# Patient Record
Sex: MALE | Race: WHITE | NOT HISPANIC OR LATINO | Employment: OTHER | URBAN - METROPOLITAN AREA
[De-identification: names, ages, dates, MRNs, and addresses within clinical notes are randomized per-mention and may not be internally consistent; named-entity substitution may affect disease eponyms.]

---

## 2018-04-13 ENCOUNTER — OFFICE VISIT (OUTPATIENT)
Dept: NEUROLOGY | Facility: MEDICAL CENTER | Age: 78
End: 2018-04-13
Payer: MEDICARE

## 2018-04-13 ENCOUNTER — HOSPITAL ENCOUNTER (OUTPATIENT)
Dept: LAB | Facility: MEDICAL CENTER | Age: 78
End: 2018-04-13
Attending: PSYCHIATRY & NEUROLOGY
Payer: MEDICARE

## 2018-04-13 VITALS
DIASTOLIC BLOOD PRESSURE: 60 MMHG | WEIGHT: 159.72 LBS | HEART RATE: 65 BPM | TEMPERATURE: 97.7 F | SYSTOLIC BLOOD PRESSURE: 130 MMHG | BODY MASS INDEX: 25.67 KG/M2 | RESPIRATION RATE: 16 BRPM | OXYGEN SATURATION: 98 % | HEIGHT: 66 IN

## 2018-04-13 DIAGNOSIS — I70.90 ATHEROSCLEROSIS: ICD-10-CM

## 2018-04-13 DIAGNOSIS — Z78.9 VEGAN DIET: ICD-10-CM

## 2018-04-13 DIAGNOSIS — G20.A1 PARKINSON DISEASE: ICD-10-CM

## 2018-04-13 DIAGNOSIS — F03.90 DEMENTIA WITHOUT BEHAVIORAL DISTURBANCE, UNSPECIFIED DEMENTIA TYPE: ICD-10-CM

## 2018-04-13 LAB
CRP SERPL HS-MCNC: 0.17 MG/DL (ref 0–0.75)
ERYTHROCYTE [SEDIMENTATION RATE] IN BLOOD BY WESTERGREN METHOD: 36 MM/HOUR (ref 0–20)
HCYS SERPL-SCNC: 18.82 UMOL/L
THYROPEROXIDASE AB SERPL-ACNC: 0.8 IU/ML (ref 0–9)

## 2018-04-13 PROCEDURE — 85652 RBC SED RATE AUTOMATED: CPT

## 2018-04-13 PROCEDURE — 99204 OFFICE O/P NEW MOD 45 MIN: CPT | Performed by: PSYCHIATRY & NEUROLOGY

## 2018-04-13 PROCEDURE — 86376 MICROSOMAL ANTIBODY EACH: CPT

## 2018-04-13 PROCEDURE — 84207 ASSAY OF VITAMIN B-6: CPT

## 2018-04-13 PROCEDURE — 86225 DNA ANTIBODY NATIVE: CPT

## 2018-04-13 PROCEDURE — 83090 ASSAY OF HOMOCYSTEINE: CPT

## 2018-04-13 PROCEDURE — 86038 ANTINUCLEAR ANTIBODIES: CPT

## 2018-04-13 PROCEDURE — 36415 COLL VENOUS BLD VENIPUNCTURE: CPT

## 2018-04-13 PROCEDURE — 86235 NUCLEAR ANTIGEN ANTIBODY: CPT | Mod: 91

## 2018-04-13 PROCEDURE — 86140 C-REACTIVE PROTEIN: CPT

## 2018-04-13 PROCEDURE — 83921 ORGANIC ACID SINGLE QUANT: CPT

## 2018-04-13 RX ORDER — M-VIT,TX,IRON,MINS/CALC/FOLIC 27MG-0.4MG
1 TABLET ORAL DAILY
COMMUNITY

## 2018-04-13 RX ORDER — MEMANTINE HYDROCHLORIDE 5 MG/1
5 TABLET ORAL 2 TIMES DAILY
Qty: 60 TAB | Refills: 3 | Status: SHIPPED | OUTPATIENT
Start: 2018-04-13 | End: 2018-08-30 | Stop reason: SDUPTHER

## 2018-04-13 RX ORDER — CHLORAL HYDRATE 500 MG
1000 CAPSULE ORAL
COMMUNITY

## 2018-04-13 RX ORDER — UBIDECARENONE 75 MG
5000 CAPSULE ORAL DAILY
COMMUNITY

## 2018-04-13 ASSESSMENT — PATIENT HEALTH QUESTIONNAIRE - PHQ9
SUM OF ALL RESPONSES TO PHQ QUESTIONS 1-9: 9
5. POOR APPETITE OR OVEREATING: 2 - MORE THAN HALF THE DAYS
CLINICAL INTERPRETATION OF PHQ2 SCORE: 3

## 2018-04-13 NOTE — PATIENT INSTRUCTIONS
Resting tremor - both sides-- : subtle  Rigidity both sides  Bradykinesia both sides R > L  Postural reflex :     Recall 0/3 ( despite 2 trials)  Mother developed dementia at the age of 80+    IMPRESSION:    1. Cognitive Decline since 2015-- progressively decline- Dementia  2. Hx of shoulder replacement and shoulder surgery  3. One episode of total memory loss-- similar to transient global amnesia( got MRI of brain in [place] 5 years ago)  4. Parkinson Diease vs Parkinsonism  5. Strict Vegan Diet -- in the past 10 years  6. Mild kidney malfunction --- Creatinine 1.7  7. Using Diaper for one year    PLAN/RECOMMENDATIONS:      Explain to the family that Mr. Naidu has dementia and parkinson    We will get MRI to exclude hydrocephalus , EEG and blood tests  We will start mediation right now    First of all, we will start   Sinemet 25/100 half tab  3 times per day for 2 weeks, then Sinemet 25/100 one tab three times per day  Call us in one month or any other side effects-- we may increase Sinemet further on the phone     After the first month, start another medication  Namenda 5mg two times per day          SIGNATURE:  Cora Goyal      CC:  Dr. Marley Sevilla MD  in [facility]

## 2018-04-13 NOTE — PROGRESS NOTES
NEUROLOGY NOTE    Referring Physician  Dr. Marley Sevilla MD  in [facility]      CHIEF COMPLAINT:  Progressive memory loss  Chief Complaint   Patient presents with   • Establish Care     Memory loss       PRESENT ILLNESS:   Progressive memory loss in the past 3 years, a little shaky recently   The patient was sent to us by his PCP for further care  Cognitive processing speed slowed down  Denied personality change or visual spatial change    Left shoulder surgery status post rotator cuff, right shoulder status post replacement    RED FLAGS for reversible dementia  Headache X  Fluctuation course X  Tremor V   Rapid Progressive onset ( within 2 years) X  MRI hippocampus not atrophy ?        PAST MEDICAL HISTORY:  No past medical history on file.    PAST SURGICAL HISTORY:  No past surgical history on file.    FAMILY HISTORY:  No family history on file.    SOCIAL HISTORY:  Social History     Social History   • Marital status:      Spouse name: N/A   • Number of children: N/A   • Years of education: N/A     Occupational History   • Not on file.     Social History Main Topics   • Smoking status: Not on file   • Smokeless tobacco: Not on file   • Alcohol use Not on file   • Drug use: Unknown   • Sexual activity: Not on file     Other Topics Concern   • Not on file     Social History Narrative   • No narrative on file     ALLERGIES:  Not on File  TOBHX  History   Smoking Status   • Not on file   Smokeless Tobacco   • Not on file     ALCHX  History   Alcohol use Not on file     DRUGHX  History   Drug use: Unknown           MEDICATIONS:  Current Outpatient Prescriptions   Medication   • cyanocobalamin (VITAMIN B-12) 100 MCG Tab   • vitamin D (CHOLECALCIFEROL) 1000 UNIT Tab   • therapeutic multivitamin-minerals (THERAGRAN-M) Tab   • Omega-3 Fatty Acids (FISH OIL) 1000 MG Cap capsule     No current facility-administered medications for this visit.        REVIEW OF SYSTEM:    Constitutional: Denies fevers, Denies weight  "changes   Eyes: Denies changes in vision, no eye pain   Ears/Nose/Throat/Mouth: hearing impairment  Cardiovascular: Denies chest pain or palpitations   Respiratory: Denies SOB.   Gastrointestinal/Hepatic: Denies abdominal pain, nausea, vomiting, diarrhea, constipation or GI bleeding   Genitourinary: Denies bladder dysfunction, dysuria or frequency   Musculoskeletal/Rheum: Denies joint pain and swelling   Skin/Breast: Denies rash, denies breast lumps or discharge   Neurological: memory problems, confusion  Psychiatric: denies mood disorder   Endocrine: denies hx of diabetes or thyroid dysfunction   Heme/Oncology/Lymph Nodes: Denies enlarged lymph nodes, denies brusing or known bleeding disorder   Allergic/Immunologic: Denies hx of allergies         PHYSICAL AND NEUROLOGICAL EXMAINATIONS:  VITAL SIGNS: /60   Pulse 65   Temp 36.5 °C (97.7 °F)   Resp 16   Ht 1.676 m (5' 6\")   Wt 72.4 kg (159 lb 11.6 oz)   SpO2 98%   BMI 25.78 kg/m²   CURRENT WEIGHT: BMI: Body mass index is 25.78 kg/m².  PREVIOUS WEIGHTS:  Wt Readings from Last 25 Encounters:   04/13/18 72.4 kg (159 lb 11.6 oz)       General appearance of patient: WDWN(+) NAD(+)    EYES  o Fundus : Papilledem(-) Exudates(-) Hemorrhage(-)  Nervous System  Orientation to time, place and person(+)  Memory normal(-) Recall 0/3  Language: aphasia(-)  Knowledge: past(+) Current(+)  Attention(+)  Cranial Nerves  • Nerve 2: intact  • Nerve 3,4,6: intact  • Nerve 5 : intact  • Nerve 7: intact  • Nerve 8: hearing impairment  • Nerve 9 & 10: intact  • Nerve 11: intact  • Nerve 12: intact  Muscle Power and muscle tone: symmetric in upper and lower  Sensory System: Pin sensation intact(+)  Reflexes: symmetric throughout  Cerebellar Function FNP normal   Gait : Steady(-)   Heart and Vascular  Peripheral Vasucular system : Edema (-) Swelling(-)  RHB, Breathing sound clear  abdomen bowel sound normoactive  Extremities freely moveable  Joints no contracture   "     NEUROIMAGING:        LAB:          Resting tremor - both sides-- : subtle  Rigidity both sides  Bradykinesia both sides R > L  Postural reflex :     Recall 0/3 ( despite 2 trials)  Mother developed dementia at the age of 80+    IMPRESSION:    1. Cognitive Decline since 2015-- progressively decline- Dementia  2. Hx of shoulder replacement and shoulder surgery  3. One episode of total memory loss-- similar to transient global amnesia( got MRI of brain in [place] 5 years ago)  4. Parkinson Diease vs Parkinsonism  5. Strict Vegan Diet -- in the past 10 years  6. Mild kidney malfunction --- Creatinine 1.7  7. Using Diaper for one year    PLAN/RECOMMENDATIONS:      Explain to the family that Mr. Naidu has dementia and parkinson    We will get MRI to exclude hydrocephalus , EEG and blood tests  We will start mediation right now    First of all, we will start   Sinemet 25/100 half tab  3 times per day for 2 weeks, then Sinemet 25/100 one tab three times per day  Call us in one month or any other side effects-- we may increase Sinemet further on the phone     After the first month, start another medication  Namenda 5mg two times per day          SIGNATURE:  Cora Goyal      CC:  Dr. Marley Sevilla MD  in [facility]

## 2018-04-15 LAB — NUCLEAR IGG SER QL IA: NORMAL

## 2018-04-17 LAB
METHYLMALONATE SERPL-SCNC: 0.19 UMOL/L (ref 0–0.4)
VIT B6 SERPL-MCNC: 121 NMOL/L (ref 20–125)

## 2018-04-25 ENCOUNTER — TELEPHONE (OUTPATIENT)
Dept: NEUROLOGY | Facility: MEDICAL CENTER | Age: 78
End: 2018-04-25

## 2018-05-08 ENCOUNTER — HOSPITAL ENCOUNTER (OUTPATIENT)
Dept: RADIOLOGY | Facility: MEDICAL CENTER | Age: 78
End: 2018-05-08
Attending: PSYCHIATRY & NEUROLOGY
Payer: MEDICARE

## 2018-05-08 DIAGNOSIS — F03.90 DEMENTIA WITHOUT BEHAVIORAL DISTURBANCE, UNSPECIFIED DEMENTIA TYPE: ICD-10-CM

## 2018-05-08 DIAGNOSIS — Z78.9 VEGAN DIET: ICD-10-CM

## 2018-05-08 DIAGNOSIS — G20.A1 PARKINSON DISEASE: ICD-10-CM

## 2018-05-08 PROCEDURE — 70551 MRI BRAIN STEM W/O DYE: CPT

## 2018-06-19 ENCOUNTER — OFFICE VISIT (OUTPATIENT)
Dept: NEPHROLOGY | Facility: MEDICAL CENTER | Age: 78
End: 2018-06-19
Payer: MEDICARE

## 2018-06-19 ENCOUNTER — HOSPITAL ENCOUNTER (OUTPATIENT)
Dept: LAB | Facility: MEDICAL CENTER | Age: 78
End: 2018-06-19
Attending: INTERNAL MEDICINE
Payer: MEDICARE

## 2018-06-19 ENCOUNTER — TELEPHONE (OUTPATIENT)
Dept: NEUROLOGY | Facility: MEDICAL CENTER | Age: 78
End: 2018-06-19

## 2018-06-19 VITALS
RESPIRATION RATE: 14 BRPM | DIASTOLIC BLOOD PRESSURE: 60 MMHG | TEMPERATURE: 99.6 F | WEIGHT: 156 LBS | OXYGEN SATURATION: 96 % | HEIGHT: 66 IN | BODY MASS INDEX: 25.07 KG/M2 | HEART RATE: 69 BPM | SYSTOLIC BLOOD PRESSURE: 130 MMHG

## 2018-06-19 DIAGNOSIS — G20.A1 PARKINSON DISEASE: ICD-10-CM

## 2018-06-19 DIAGNOSIS — N17.9 AKI (ACUTE KIDNEY INJURY) (HCC): ICD-10-CM

## 2018-06-19 DIAGNOSIS — M54.9 CHRONIC BACK PAIN, UNSPECIFIED BACK LOCATION, UNSPECIFIED BACK PAIN LATERALITY: ICD-10-CM

## 2018-06-19 DIAGNOSIS — D64.9 ANEMIA, UNSPECIFIED TYPE: ICD-10-CM

## 2018-06-19 DIAGNOSIS — R32 URINARY INCONTINENCE, UNSPECIFIED TYPE: ICD-10-CM

## 2018-06-19 DIAGNOSIS — D49.7 PITUITARY TUMOR: ICD-10-CM

## 2018-06-19 DIAGNOSIS — G89.29 CHRONIC BACK PAIN, UNSPECIFIED BACK LOCATION, UNSPECIFIED BACK PAIN LATERALITY: ICD-10-CM

## 2018-06-19 LAB
ANION GAP SERPL CALC-SCNC: 11 MMOL/L (ref 0–11.9)
BASOPHILS # BLD AUTO: 0.9 % (ref 0–1.8)
BASOPHILS # BLD: 0.04 K/UL (ref 0–0.12)
BUN SERPL-MCNC: 51 MG/DL (ref 8–22)
C3 SERPL-MCNC: 129 MG/DL (ref 87–200)
C4 SERPL-MCNC: 34 MG/DL (ref 19–52)
CALCIUM SERPL-MCNC: 9.3 MG/DL (ref 8.5–10.5)
CHLORIDE SERPL-SCNC: 100 MMOL/L (ref 96–112)
CO2 SERPL-SCNC: 26 MMOL/L (ref 20–33)
CREAT SERPL-MCNC: 2.68 MG/DL (ref 0.5–1.4)
CREAT UR-MCNC: 52.6 MG/DL
EOSINOPHIL # BLD AUTO: 0.06 K/UL (ref 0–0.51)
EOSINOPHIL NFR BLD: 1.4 % (ref 0–6.9)
ERYTHROCYTE [DISTWIDTH] IN BLOOD BY AUTOMATED COUNT: 42.9 FL (ref 35.9–50)
FERRITIN SERPL-MCNC: 179.9 NG/ML (ref 22–322)
GLUCOSE SERPL-MCNC: 105 MG/DL (ref 65–99)
HCT VFR BLD AUTO: 30 % (ref 42–52)
HGB BLD-MCNC: 10.1 G/DL (ref 14–18)
IMM GRANULOCYTES # BLD AUTO: 0.02 K/UL (ref 0–0.11)
IMM GRANULOCYTES NFR BLD AUTO: 0.5 % (ref 0–0.9)
IRON SATN MFR SERPL: 15 % (ref 15–55)
IRON SERPL-MCNC: 47 UG/DL (ref 50–180)
LYMPHOCYTES # BLD AUTO: 1.22 K/UL (ref 1–4.8)
LYMPHOCYTES NFR BLD: 27.5 % (ref 22–41)
MCH RBC QN AUTO: 30.9 PG (ref 27–33)
MCHC RBC AUTO-ENTMCNC: 33.7 G/DL (ref 33.7–35.3)
MCV RBC AUTO: 91.7 FL (ref 81.4–97.8)
MICROALBUMIN UR-MCNC: <0.7 MG/DL
MICROALBUMIN/CREAT UR: NORMAL MG/G (ref 0–30)
MONOCYTES # BLD AUTO: 0.44 K/UL (ref 0–0.85)
MONOCYTES NFR BLD AUTO: 9.9 % (ref 0–13.4)
NEUTROPHILS # BLD AUTO: 2.66 K/UL (ref 1.82–7.42)
NEUTROPHILS NFR BLD: 59.8 % (ref 44–72)
NRBC # BLD AUTO: 0 K/UL
NRBC BLD-RTO: 0 /100 WBC
PLATELET # BLD AUTO: 196 K/UL (ref 164–446)
PMV BLD AUTO: 10.4 FL (ref 9–12.9)
POTASSIUM SERPL-SCNC: 4.1 MMOL/L (ref 3.6–5.5)
RBC # BLD AUTO: 3.27 M/UL (ref 4.7–6.1)
SODIUM SERPL-SCNC: 137 MMOL/L (ref 135–145)
TIBC SERPL-MCNC: 316 UG/DL (ref 250–450)
TRANSFERRIN SERPL-MCNC: 228 MG/DL (ref 200–370)
WBC # BLD AUTO: 4.4 K/UL (ref 4.8–10.8)

## 2018-06-19 PROCEDURE — 99203 OFFICE O/P NEW LOW 30 MIN: CPT | Performed by: INTERNAL MEDICINE

## 2018-06-19 PROCEDURE — 86160 COMPLEMENT ANTIGEN: CPT

## 2018-06-19 PROCEDURE — 82043 UR ALBUMIN QUANTITATIVE: CPT

## 2018-06-19 PROCEDURE — 84165 PROTEIN E-PHORESIS SERUM: CPT

## 2018-06-19 PROCEDURE — 82570 ASSAY OF URINE CREATININE: CPT

## 2018-06-19 PROCEDURE — 86038 ANTINUCLEAR ANTIBODIES: CPT

## 2018-06-19 PROCEDURE — 84466 ASSAY OF TRANSFERRIN: CPT

## 2018-06-19 PROCEDURE — 85025 COMPLETE CBC W/AUTO DIFF WBC: CPT

## 2018-06-19 PROCEDURE — 80048 BASIC METABOLIC PNL TOTAL CA: CPT

## 2018-06-19 PROCEDURE — 83540 ASSAY OF IRON: CPT

## 2018-06-19 PROCEDURE — 83550 IRON BINDING TEST: CPT

## 2018-06-19 PROCEDURE — 82728 ASSAY OF FERRITIN: CPT

## 2018-06-19 PROCEDURE — 36415 COLL VENOUS BLD VENIPUNCTURE: CPT

## 2018-06-19 PROCEDURE — 84160 ASSAY OF PROTEIN ANY SOURCE: CPT

## 2018-06-19 ASSESSMENT — ENCOUNTER SYMPTOMS
DEPRESSION: 1
NAUSEA: 0
NERVOUS/ANXIOUS: 1
MEMORY LOSS: 1
VOMITING: 0
SHORTNESS OF BREATH: 0
COUGH: 0

## 2018-06-19 NOTE — PROGRESS NOTES
"Subjective:      Hal Naidu is a 78 y.o. male who presents with Chronic Kidney Disease            Patient is an unfortunate 78-year-old gentleman, recently diagnosed with dementia/Parkinson disease.  He also developed acute kidney injury with a creatinine in 2017 was 1 mg/dL, 1.7 mg/dL in February 2017, and in April was 2.4 mg/dL.  Patient was on chronic NSAIDs use for chronic back pain      Chronic Kidney Disease   This is a new problem. The current episode started more than 1 month ago. The problem occurs constantly. The problem has been rapidly worsening. Associated symptoms include urinary symptoms. Pertinent negatives include no chest pain, coughing, nausea or vomiting. Exacerbated by: NSAIDs.       Review of Systems   Respiratory: Negative for cough and shortness of breath.    Cardiovascular: Negative for chest pain and leg swelling.   Gastrointestinal: Negative for nausea and vomiting.   Genitourinary: Negative for dysuria, frequency and urgency.        Urinary incontinence   Psychiatric/Behavioral: Positive for depression and memory loss. The patient is nervous/anxious.    All other systems reviewed and are negative.         Objective:     /60   Pulse 69   Temp 37.6 °C (99.6 °F)   Resp 14   Ht 1.676 m (5' 6\")   Wt 70.8 kg (156 lb)   SpO2 96%   BMI 25.18 kg/m²      Physical Exam   Constitutional: He is oriented to person, place, and time. He appears well-developed and well-nourished. No distress.   HENT:   Head: Normocephalic and atraumatic.   Right Ear: External ear normal.   Left Ear: External ear normal.   Nose: Nose normal.   Mouth/Throat: No oropharyngeal exudate.   Eyes: Conjunctivae are normal. Right eye exhibits no discharge. Left eye exhibits no discharge. No scleral icterus.   Neck: No JVD present. No tracheal deviation present. No thyromegaly present.   Cardiovascular: Normal rate and regular rhythm.    Pulmonary/Chest: Effort normal and breath sounds normal. No " respiratory distress. He has no wheezes. He has no rales.   Abdominal: Soft. Bowel sounds are normal. He exhibits no distension. There is no tenderness. There is no guarding.   Musculoskeletal: He exhibits no edema or tenderness.   Lymphadenopathy:     He has no cervical adenopathy.   Neurological: He is alert and oriented to person, place, and time.   Skin: Skin is warm and dry. He is not diaphoretic. No erythema.   Psychiatric: His behavior is normal. His mood appears anxious.   Nursing note and vitals reviewed.    I have reviewed the recent MRI results  I also discussed the case with the patient's neurologist           Assessment/Plan:     1. UZIEL (acute kidney injury) (HCC)  The etiology is not very clear, possible chronic interstitial nephritis from NSAID use  We need to rule out obstructive uropathy  Check renal ultrasound  No acute need for dialysis  High-risk  Consider a kidney biopsy  2. Anemia, unspecified type  Check SPEP to rule out multiple myeloma    3. Chronic back pain, unspecified back location, unspecified back pain laterality  Avoid nephrotoxins like NSAIDs    4. Parkinson disease (HCC)    5. Urinary incontinence, unspecified type  Check renal ultrasound

## 2018-06-19 NOTE — TELEPHONE ENCOUNTER
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

## 2018-06-21 ENCOUNTER — TELEPHONE (OUTPATIENT)
Dept: NEUROLOGY | Facility: MEDICAL CENTER | Age: 78
End: 2018-06-21

## 2018-06-21 LAB — NUCLEAR IGG SER QL IA: NORMAL

## 2018-06-21 NOTE — TELEPHONE ENCOUNTER
Spoke to wife she was asking if I could give results to her. I explained do to JAG I could not without his permission. I will call back when she's  home with him.

## 2018-06-22 LAB
ALBUMIN SERPL-MCNC: 4.41 G/DL (ref 3.75–5.01)
ALPHA1 GLOB SERPL ELPH-MCNC: 0.34 G/DL (ref 0.19–0.46)
ALPHA2 GLOB SERPL ELPH-MCNC: 0.99 G/DL (ref 0.48–1.05)
B-GLOBULIN SERPL ELPH-MCNC: 0.86 G/DL (ref 0.48–1.1)
GAMMA GLOB SERPL ELPH-MCNC: 1.1 G/DL (ref 0.62–1.51)
INTERPRETATION SERPL IFE-IMP: NORMAL
MONOCLON BAND OBS SERPL: NORMAL
PATHOLOGY STUDY: NORMAL
PROT SERPL-MCNC: 7.7 G/DL (ref 6–8.3)

## 2018-06-25 ENCOUNTER — TELEPHONE (OUTPATIENT)
Dept: NEPHROLOGY | Facility: MEDICAL CENTER | Age: 78
End: 2018-06-25

## 2018-06-25 NOTE — TELEPHONE ENCOUNTER
Hello Dr. Najjar,    Pt's wife called this morning and is wondering if you can please review her husbands lab results.    Thank you!

## 2018-06-26 ENCOUNTER — HOSPITAL ENCOUNTER (OUTPATIENT)
Dept: RADIOLOGY | Facility: MEDICAL CENTER | Age: 78
End: 2018-06-26
Attending: INTERNAL MEDICINE
Payer: MEDICARE

## 2018-06-26 DIAGNOSIS — R32 URINARY INCONTINENCE, UNSPECIFIED TYPE: ICD-10-CM

## 2018-06-26 DIAGNOSIS — N17.9 AKI (ACUTE KIDNEY INJURY) (HCC): ICD-10-CM

## 2018-06-26 PROCEDURE — 76775 US EXAM ABDO BACK WALL LIM: CPT

## 2018-06-27 ENCOUNTER — OFFICE VISIT (OUTPATIENT)
Dept: NEUROLOGY | Facility: MEDICAL CENTER | Age: 78
End: 2018-06-27
Payer: MEDICARE

## 2018-06-27 ENCOUNTER — HOSPITAL ENCOUNTER (OUTPATIENT)
Dept: LAB | Facility: MEDICAL CENTER | Age: 78
End: 2018-06-27
Attending: PSYCHIATRY & NEUROLOGY
Payer: MEDICARE

## 2018-06-27 VITALS
RESPIRATION RATE: 14 BRPM | HEART RATE: 65 BPM | OXYGEN SATURATION: 100 % | BODY MASS INDEX: 24.18 KG/M2 | WEIGHT: 150.46 LBS | TEMPERATURE: 97.9 F | SYSTOLIC BLOOD PRESSURE: 128 MMHG | DIASTOLIC BLOOD PRESSURE: 60 MMHG | HEIGHT: 66 IN

## 2018-06-27 DIAGNOSIS — D49.7 PITUITARY TUMOR: ICD-10-CM

## 2018-06-27 DIAGNOSIS — G20.A1 PARKINSON DISEASE: ICD-10-CM

## 2018-06-27 DIAGNOSIS — F02.80 DEMENTIA ASSOCIATED WITH OTHER UNDERLYING DISEASE WITHOUT BEHAVIORAL DISTURBANCE (HCC): ICD-10-CM

## 2018-06-27 LAB
ANION GAP SERPL CALC-SCNC: 9 MMOL/L (ref 0–11.9)
BUN SERPL-MCNC: 29 MG/DL (ref 8–22)
CALCIUM SERPL-MCNC: 9.3 MG/DL (ref 8.5–10.5)
CHLORIDE SERPL-SCNC: 101 MMOL/L (ref 96–112)
CO2 SERPL-SCNC: 26 MMOL/L (ref 20–33)
CORTIS SERPL-MCNC: 7.8 UG/DL (ref 0–23)
CREAT SERPL-MCNC: 1.9 MG/DL (ref 0.5–1.4)
FSH SERPL-ACNC: 12.1 MIU/ML (ref 1.5–12.4)
GLUCOSE SERPL-MCNC: 84 MG/DL (ref 65–99)
LH SERPL-ACNC: 5 IU/L (ref 1.7–8.6)
POTASSIUM SERPL-SCNC: 4 MMOL/L (ref 3.6–5.5)
PROLACTIN SERPL-MCNC: 45.18 NG/ML (ref 2.1–17.7)
SODIUM SERPL-SCNC: 136 MMOL/L (ref 135–145)
T3 SERPL-MCNC: 60.2 NG/DL (ref 60–181)
T4 SERPL-MCNC: 5.2 UG/DL (ref 4–12)
TSH SERPL DL<=0.005 MIU/L-ACNC: 2.52 UIU/ML (ref 0.38–5.33)

## 2018-06-27 PROCEDURE — 36415 COLL VENOUS BLD VENIPUNCTURE: CPT

## 2018-06-27 PROCEDURE — 99214 OFFICE O/P EST MOD 30 MIN: CPT | Performed by: PSYCHIATRY & NEUROLOGY

## 2018-06-27 PROCEDURE — 84480 ASSAY TRIIODOTHYRONINE (T3): CPT

## 2018-06-27 PROCEDURE — 83001 ASSAY OF GONADOTROPIN (FSH): CPT

## 2018-06-27 PROCEDURE — 82024 ASSAY OF ACTH: CPT

## 2018-06-27 PROCEDURE — 83002 ASSAY OF GONADOTROPIN (LH): CPT

## 2018-06-27 PROCEDURE — 84146 ASSAY OF PROLACTIN: CPT

## 2018-06-27 PROCEDURE — 80048 BASIC METABOLIC PNL TOTAL CA: CPT

## 2018-06-27 PROCEDURE — 83003 ASSAY GROWTH HORMONE (HGH): CPT

## 2018-06-27 PROCEDURE — 82533 TOTAL CORTISOL: CPT

## 2018-06-27 PROCEDURE — 84436 ASSAY OF TOTAL THYROXINE: CPT

## 2018-06-27 PROCEDURE — 84443 ASSAY THYROID STIM HORMONE: CPT

## 2018-06-27 PROCEDURE — 84305 ASSAY OF SOMATOMEDIN: CPT

## 2018-06-27 NOTE — PATIENT INSTRUCTIONS
Sinemet 25/100 half tab  3 times per day for 2 weeks, then Sinemet 25/100 one tab three times per day made him sick-- will wait till the patient to become stable physically    IMPRESSION:    1. Cognitive Decline since 2015-- progressively decline- Dementia  2. Hx of shoulder replacement and shoulder surgery  3. One episode of total memory loss-- similar to transient global amnesia( got MRI of brain in [place] 5 years ago)  4. Parkinson Diease vs Parkinsonism  5. Strict Vegan Diet -- in the past 10 years  6. Mild kidney malfunction --- Creatinine 1.7,   7. Using Diaper for one year  8. Weight Loss since April 2018, constipation  9. Sinemet 25/100 half tab  3 times per day for 2 weeks, then Sinemet 25/100 one tab three times per day    PLAN/RECOMMENDATIONS:      Explain to the family that Mr. Naidu has dementia and parkinson    Waiting for EEG and blood tests  We will refer the patient to endocrinologist  Continue Namenda  ________________________________________________________________________    Magnesium L Threonate Capsules (Magtein) 2000mg daily  Vitamin B1( thiamine) 500mg daily  ________________________________________________________________________                SIGNATURE:  Cora Goyal      CC:  Dr. Marley Sevilla MD  in [facility]

## 2018-06-27 NOTE — PROGRESS NOTES
NEUROLOGY NOTE    Referring Physician  Dr. Marley Sevilla MD  in [facility]      CHIEF COMPLAINT:  Progressive memory loss  Chief Complaint   Patient presents with   • Follow-Up     Dementia       PRESENT ILLNESS:   Since last visit, the patient's mental conditions and physical conditions continue to deteriorate-- constipation, weight loss, urine    Progressive memory loss in the past 3 years, a little shaky recently   The patient was sent to us by his PCP for further care  Cognitive processing speed slowed down  Denied personality change or visual spatial change    Left shoulder surgery status post rotator cuff, right shoulder status post replacement    RED FLAGS for reversible dementia  Headache X  Fluctuation course X  Tremor V   Rapid Progressive onset ( within 2 years) X  MRI hippocampus not atrophy ?        PAST MEDICAL HISTORY:  No past medical history on file.    PAST SURGICAL HISTORY:  No past surgical history on file.    FAMILY HISTORY:  No family history on file.    SOCIAL HISTORY:  Social History     Social History   • Marital status:      Spouse name: N/A   • Number of children: N/A   • Years of education: N/A     Occupational History   • Not on file.     Social History Main Topics   • Smoking status: Not on file   • Smokeless tobacco: Not on file   • Alcohol use Not on file   • Drug use: Unknown   • Sexual activity: Not on file     Other Topics Concern   • Not on file     Social History Narrative   • No narrative on file     ALLERGIES:  No Known Allergies  TOBHX  History   Smoking Status   • Not on file   Smokeless Tobacco   • Not on file     ALCHX  History   Alcohol use Not on file     DRUGHX  History   Drug use: Unknown           MEDICATIONS:  Current Outpatient Prescriptions   Medication   • cyanocobalamin (VITAMIN B-12) 100 MCG Tab   • vitamin D (CHOLECALCIFEROL) 1000 UNIT Tab   • therapeutic multivitamin-minerals (THERAGRAN-M) Tab   • Omega-3 Fatty Acids (FISH OIL) 1000 MG Cap capsule   •  "memantine (NAMENDA) 5 MG Tab   • carbidopa-levodopa (SINEMET)  MG Tab     No current facility-administered medications for this visit.        REVIEW OF SYSTEM:    Constitutional: Denies fevers, Denies weight changes   Eyes: Denies changes in vision, no eye pain   Ears/Nose/Throat/Mouth: hearing impairment  Cardiovascular: Denies chest pain or palpitations   Respiratory: Denies SOB.   Gastrointestinal/Hepatic: Denies abdominal pain, nausea, vomiting, diarrhea, constipation or GI bleeding   Genitourinary: Denies bladder dysfunction, dysuria or frequency   Musculoskeletal/Rheum: Denies joint pain and swelling   Skin/Breast: Denies rash, denies breast lumps or discharge   Neurological: memory problems, confusion  Psychiatric: denies mood disorder   Endocrine: denies hx of diabetes or thyroid dysfunction   Heme/Oncology/Lymph Nodes: Denies enlarged lymph nodes, denies brusing or known bleeding disorder   Allergic/Immunologic: Denies hx of allergies         PHYSICAL AND NEUROLOGICAL EXMAINATIONS:  VITAL SIGNS: /60   Pulse 65   Temp 36.6 °C (97.9 °F)   Resp 14   Ht 1.676 m (5' 6\")   Wt 68.2 kg (150 lb 7.4 oz)   SpO2 100%   BMI 24.29 kg/m²   CURRENT WEIGHT: BMI: Body mass index is 24.29 kg/m².  PREVIOUS WEIGHTS:  Wt Readings from Last 25 Encounters:   06/27/18 68.2 kg (150 lb 7.4 oz)   06/19/18 70.8 kg (156 lb)   04/13/18 72.4 kg (159 lb 11.6 oz)       General appearance of patient: WDWN(+) NAD(+)    EYES  o Fundus : Papilledem(-) Exudates(-) Hemorrhage(-)  Nervous System  Orientation to time, place and person(+)  Memory normal(-) Recall 0/3  Language: aphasia(-)  Knowledge: past(+) Current(+)  Attention(+)  Cranial Nerves  • Nerve 2: intact  • Nerve 3,4,6: intact  • Nerve 5 : intact  • Nerve 7: intact  • Nerve 8: hearing impairment  • Nerve 9 & 10: intact  • Nerve 11: intact  • Nerve 12: intact  Muscle Power and muscle tone: symmetric in upper and lower  Sensory System: Pin sensation intact(+)  Reflexes: " symmetric throughout  Cerebellar Function FNP normal   Gait : Steady(-)   Heart and Vascular  Peripheral Vasucular system : Edema (-) Swelling(-)  RHB, Breathing sound clear  abdomen bowel sound normoactive  Extremities freely moveable  Joints no contracture       NEUROIMAGING:        LAB:          Resting tremor - both sides-- : subtle  Rigidity both sides  Bradykinesia both sides R > L  Postural reflex :     Recall 0/3 ( despite 2 trials)  Mother developed dementia at the age of 80+    Sinemet 25/100 half tab  3 times per day for 2 weeks, then Sinemet 25/100 one tab three times per day made him sick-- will wait till the patient to become stable physically    IMPRESSION:    1. Cognitive Decline since 2015-- progressively decline- Dementia  2. Hx of shoulder replacement and shoulder surgery  3. One episode of total memory loss-- similar to transient global amnesia( got MRI of brain in [place] 5 years ago)  4. Parkinson Diease vs Parkinsonism  5. Strict Vegan Diet -- in the past 10 years  6. Mild kidney malfunction --- Creatinine 1.7,   7. Using Diaper for one year  8. Weight Loss since April 2018, constipation  9. Sinemet 25/100 half tab  3 times per day for 2 weeks, then Sinemet 25/100 one tab three times per day    PLAN/RECOMMENDATIONS:      Explain to the family that Mr. Naidu has dementia and parkinson    Waiting for EEG and blood tests  We will refer the patient to endocrinologist  Continue Namenda  ________________________________________________________________________    Magnesium L Threonate Capsules (Magtein) 2000mg daily  Vitamin B1( thiamine) 500mg daily  ________________________________________________________________________                SIGNATURE:  Cora Goyal      CC:  Dr. Marley Sevilla MD  in [place]

## 2018-06-28 LAB
ACTH PLAS-MCNC: 19 PG/ML (ref 7–69)
GHRH SERPL-MCNC: 0.37 NG/ML (ref 0.05–3)
IGF-I SERPL-MCNC: 213 NG/ML (ref 21–204)
IGF-I Z-SCORE SERPL: 1.7

## 2018-07-02 ENCOUNTER — TELEPHONE (OUTPATIENT)
Dept: NEUROLOGY | Facility: MEDICAL CENTER | Age: 78
End: 2018-07-02

## 2018-07-02 NOTE — TELEPHONE ENCOUNTER
Dr. Goyal can you please give me the results of the lab work the patient is calling for them.    Thank you,  caden

## 2018-07-03 ENCOUNTER — HOSPITAL ENCOUNTER (OUTPATIENT)
Dept: LAB | Facility: MEDICAL CENTER | Age: 78
End: 2018-07-03
Attending: PHYSICIAN ASSISTANT
Payer: MEDICARE

## 2018-07-03 ENCOUNTER — NON-PROVIDER VISIT (OUTPATIENT)
Dept: NEUROLOGY | Facility: MEDICAL CENTER | Age: 78
End: 2018-07-03
Payer: MEDICARE

## 2018-07-03 DIAGNOSIS — F03.90 DEMENTIA WITHOUT BEHAVIORAL DISTURBANCE, UNSPECIFIED DEMENTIA TYPE: ICD-10-CM

## 2018-07-03 LAB — PSA SERPL-MCNC: 2.37 NG/ML (ref 0–4)

## 2018-07-03 PROCEDURE — 84153 ASSAY OF PSA TOTAL: CPT | Mod: GA

## 2018-07-03 PROCEDURE — 95819 EEG AWAKE AND ASLEEP: CPT | Performed by: PSYCHIATRY & NEUROLOGY

## 2018-07-03 PROCEDURE — 36415 COLL VENOUS BLD VENIPUNCTURE: CPT | Mod: GA

## 2018-07-03 NOTE — TELEPHONE ENCOUNTER
Called the patient reviewed the results with patient and wife. Patient has appointment with endocrinologist in august    Dr. Toney note:  The patient has one pituitary tumor   Borderline high in endocrine functions   Not life threatening, but definitely needs to see endocrinologist   I already referred the patient to endocrinologist in the last visit       Results for RON LIZETH FARRAR (MRN [id number]) as of 7/3/2018 15:24     6/27/2018 11:26   IGF1: 213 (H)   IGF-1 Z Score Calculation: 1.7   Luteinizing Hormone: 5.0   Prolactin: 45.18 (H)

## 2018-07-09 NOTE — PROCEDURES
DATE OF SERVICE:  07/03/2018    This is an outpatient EEG, was done on 07/03/2018.    ROUTINE ELECTROENCEPHALOGRAM REPORT        NAME: Hal Naidu     REFERRING Dr: ANGIE     DURATION: 24 minutes     INDICATION: Cognitive decline since 2015 - progrssivley. One epiesode of total memory loss (TGA).        TECHNIQUE: 30 channel routine electroencephalogram (EEG) was performed in accordance with the international 10-20 system. The study was reviewed in bipolar and referential montages. The recording examined the patient during wakeful and drowsy state(s).      DESCRIPTION OF THE RECORD:        Background rhythm during awake stage shows well-organized, well-developed, average voltage 9 to 11 hertz alpha activity in the posterior regions.  It blocks with eye opening and it is bilaterally synchronous and symmetrical.  No spike-and-wave discharges but poly and monomorphic delta abnormalities at times generalized and at times more over left are seen.  Photic stimulation did not produce any abnormalities. Stage I sleep was achieved.        ACTIVATION PROCEDURES:       Photic Stimulation were done     ICTAL AND/OR INTERICTAL FINDINGS:    No spike-and-wave discharges but poly and monomorphic delta abnormalities at times generalized and at times more over left are seen No clinical events or seizures were reported or recorded during the study.       EKG: sampling of the EKG recording demonstrated sinus rhythm.          INTERPRETATION:        ________________________________________________________________________     This is abnormal routine EEG recording in the awake and drowsy/sleep state(s).     This scalp EEG denotes                  1. Mild focal cortical dysfunction more over frontal (L>R) --this patten would increase the risk of seizure - advise clinical correlation regarding management      ________________________________________________________________________                         ____________________________________     MD SHANDA ORDONEZ    DD:  07/09/2018 07:12:58  DT:  07/09/2018 07:19:07    D#:  [id number]  Job#:  [id number]

## 2018-07-17 ENCOUNTER — OFFICE VISIT (OUTPATIENT)
Dept: NEUROLOGY | Facility: MEDICAL CENTER | Age: 78
End: 2018-07-17
Payer: MEDICARE

## 2018-07-17 VITALS
TEMPERATURE: 98.4 F | HEART RATE: 64 BPM | WEIGHT: 153.77 LBS | DIASTOLIC BLOOD PRESSURE: 58 MMHG | SYSTOLIC BLOOD PRESSURE: 120 MMHG | RESPIRATION RATE: 16 BRPM | OXYGEN SATURATION: 98 % | HEIGHT: 66 IN | BODY MASS INDEX: 24.71 KG/M2

## 2018-07-17 DIAGNOSIS — D49.7 PITUITARY TUMOR: ICD-10-CM

## 2018-07-17 DIAGNOSIS — R94.01 ABNORMAL EEG: ICD-10-CM

## 2018-07-17 DIAGNOSIS — G20.A1 PARKINSON DISEASE: ICD-10-CM

## 2018-07-17 PROCEDURE — 99214 OFFICE O/P EST MOD 30 MIN: CPT | Performed by: PSYCHIATRY & NEUROLOGY

## 2018-07-17 RX ORDER — LEVOFLOXACIN 250 MG/1
TABLET, FILM COATED ORAL
Refills: 0 | COMMUNITY
Start: 2018-07-09 | End: 2018-10-11

## 2018-07-17 RX ORDER — TAMSULOSIN HYDROCHLORIDE 0.4 MG/1
CAPSULE ORAL
Refills: 5 | COMMUNITY
Start: 2018-07-05

## 2018-07-17 RX ORDER — LEVETIRACETAM 250 MG/1
250 TABLET ORAL 2 TIMES DAILY
Qty: 60 TAB | Refills: 3 | Status: SHIPPED | OUTPATIENT
Start: 2018-07-17 | End: 2018-08-30 | Stop reason: SDUPTHER

## 2018-07-17 RX ORDER — FINASTERIDE 5 MG/1
TABLET, FILM COATED ORAL
Refills: 3 | COMMUNITY
Start: 2018-07-11

## 2018-07-17 RX ORDER — THIAMINE MONONITRATE (VIT B1) 100 MG
100 TABLET ORAL DAILY
COMMUNITY

## 2018-07-17 NOTE — PROGRESS NOTES
NEUROLOGY NOTE    Referring Physician  Dr. Marley Sevilla MD  in [facility]      CHIEF COMPLAINT:  Progressive memory loss  Chief Complaint   Patient presents with   • Follow-Up     Dementia       PRESENT ILLNESS:   Since last visit, the patient's mental conditions and physical conditions continue to deteriorate-- constipation, weight loss, urine    Progressive memory loss in the past 3 years, a little shaky recently   The patient was sent to us by his PCP for further care  Cognitive processing speed slowed down  Denied personality change or visual spatial change    Left shoulder surgery status post rotator cuff, right shoulder status post replacement    RED FLAGS for reversible dementia  Headache X  Fluctuation course X  Tremor V   Rapid Progressive onset ( within 2 years) X  MRI hippocampus not atrophy ?        PAST MEDICAL HISTORY:  No past medical history on file.    PAST SURGICAL HISTORY:  No past surgical history on file.    FAMILY HISTORY:  No family history on file.    SOCIAL HISTORY:  Social History     Social History   • Marital status:      Spouse name: N/A   • Number of children: N/A   • Years of education: N/A     Occupational History   • Not on file.     Social History Main Topics   • Smoking status: Not on file   • Smokeless tobacco: Not on file   • Alcohol use Not on file   • Drug use: Unknown   • Sexual activity: Not on file     Other Topics Concern   • Not on file     Social History Narrative   • No narrative on file     ALLERGIES:  No Known Allergies  TOBHX  History   Smoking Status   • Not on file   Smokeless Tobacco   • Not on file     ALCHX  History   Alcohol use Not on file     DRUGHX  History   Drug use: Unknown           MEDICATIONS:  Current Outpatient Prescriptions   Medication   • finasteride (PROSCAR) 5 MG Tab   • levoFLOXacin (LEVAQUIN) 250 MG Tab   • tamsulosin (FLOMAX) 0.4 MG capsule   • thiamine (THIAMINE) 100 MG Tab   • Diphenhydramine-APAP, sleep, (TYLENOL PM EXTRA STRENGTH PO)  "  • cyanocobalamin (VITAMIN B-12) 100 MCG Tab   • vitamin D (CHOLECALCIFEROL) 1000 UNIT Tab   • therapeutic multivitamin-minerals (THERAGRAN-M) Tab   • Omega-3 Fatty Acids (FISH OIL) 1000 MG Cap capsule   • memantine (NAMENDA) 5 MG Tab   • carbidopa-levodopa (SINEMET)  MG Tab     No current facility-administered medications for this visit.        REVIEW OF SYSTEM:    Constitutional: Denies fevers, Denies weight changes   Eyes: Denies changes in vision, no eye pain   Ears/Nose/Throat/Mouth: hearing impairment  Cardiovascular: Denies chest pain or palpitations   Respiratory: Denies SOB.   Gastrointestinal/Hepatic: Denies abdominal pain, nausea, vomiting, diarrhea, constipation or GI bleeding   Genitourinary: Denies bladder dysfunction, dysuria or frequency   Musculoskeletal/Rheum: Denies joint pain and swelling   Skin/Breast: Denies rash, denies breast lumps or discharge   Neurological: memory problems, confusion  Psychiatric: denies mood disorder   Endocrine: denies hx of diabetes or thyroid dysfunction   Heme/Oncology/Lymph Nodes: Denies enlarged lymph nodes, denies brusing or known bleeding disorder   Allergic/Immunologic: Denies hx of allergies         PHYSICAL AND NEUROLOGICAL EXMAINATIONS:  VITAL SIGNS: /58   Pulse 64   Temp 36.9 °C (98.4 °F)   Resp 16   Ht 1.676 m (5' 6\")   Wt 69.7 kg (153 lb 12.3 oz)   SpO2 98%   BMI 24.82 kg/m²   CURRENT WEIGHT: BMI: Body mass index is 24.82 kg/m².  PREVIOUS WEIGHTS:  Wt Readings from Last 25 Encounters:   07/17/18 69.7 kg (153 lb 12.3 oz)   06/27/18 68.2 kg (150 lb 7.4 oz)   06/19/18 70.8 kg (156 lb)   04/13/18 72.4 kg (159 lb 11.6 oz)       General appearance of patient: WDWN(+) NAD(+)    EYES  o Fundus : Papilledem(-) Exudates(-) Hemorrhage(-)  Nervous System  Orientation to time, place and person(+)  Memory normal(-) Recall 0/3  Language: aphasia(-)  Knowledge: past(+) Current(+)  Attention(+)  Cranial Nerves  • Nerve 2: intact  • Nerve 3,4,6: intact  • " Nerve 5 : intact  • Nerve 7: intact  • Nerve 8: hearing impairment  • Nerve 9 & 10: intact  • Nerve 11: intact  • Nerve 12: intact  Muscle Power and muscle tone: symmetric in upper and lower  Sensory System: Pin sensation intact(+)  Reflexes: symmetric throughout  Cerebellar Function FNP normal   Gait : Steady(-)   Heart and Vascular  Peripheral Vasucular system : Edema (-) Swelling(-)  RHB, Breathing sound clear  abdomen bowel sound normoactive  Extremities freely moveable  Joints no contracture       NEUROIMAGING:        LAB:          Resting tremor - both sides-- : subtle  Rigidity both sides  Bradykinesia both sides R > L  Postural reflex :     Recall 0/3 ( despite 2 trials)  Mother developed dementia at the age of 80+    Sinemet 25/100 half tab  3 times per day for 2 weeks, then Sinemet 25/100 one tab three times per day made him sick-- will wait till the patient to become stable physically    IMPRESSION:    1. Cognitive Decline since 2015-- progressively decline- Dementia  2. Hx of shoulder replacement and shoulder surgery  3. One episode of total memory loss-- similar to transient global amnesia( got MRI of brain in [place] 5 years ago)  4. Parkinson Diease vs Parkinsonism  5. Strict Vegan Diet -- in the past 10 years  6. Mild kidney malfunction --- Creatinine 1.7,   7. Using Diaper for one year  8. Weight Loss since April 2018, constipation  9. Abnormal EEG      PLAN/RECOMMENDATIONS:      Explain to the family that Mr. Naidu has dementia and parkinson      This time, we will try Keppra 125mg two times per day , increase the Keppra to 250mg two times per day    We will refer the patient to endocrinologist  Continue Namenda  ________________________________________________________________________    Magnesium L Threonate Capsules (Magtein) 2000mg daily  Vitamin B1( thiamine) 500mg  daily  ________________________________________________________________________    ________________________________________________________________________    MAGNESIUM     Neurology. 2017 Oct 17;89(44):9871-7144. doi: 10.[email or web address]/WNL.[id number]. Epub 2017 Sep 20.  Serum magnesium is associated with the risk of dementia.  Shima BCT1, Bakari S1, Balbir FJ1, Lorelei MK1, Sergio EJ1, Rhonda R1, Sharon BH2, Lorelei MA1.    Mol Brain. 2014 Sep 13;7:65. doi: 10.1186/[id number].  Elevation of brain magnesium prevents synaptic loss and reverses cognitive deficits in Alzheimer's disease mouse model.  Sidra W, Bekah J, Jorden Y, Hiram X, Orin N, Estefania Y, Hiram X, Boubacar W, Terrence C, Jorden MCADAMSG, Becka D, Jorden G1.    ________________________________________________________________________    ROUTINE ELECTROENCEPHALOGRAM REPORT      NAME: Hal Naidu        INTERPRETATION:      ________________________________________________________________________    This is abnormal routine EEG recording in the awake and drowsy/sleep state(s).    This scalp EEG denotes      1. Mild focal cortical dysfunction more over frontal (L>R) --this patten would increase the risk of seizure - advise clinical correlation regarding management     ________________________________________________________________________                                      SIGNATURE:  oCra Goyal      CC:  Dr. Marley Sevilla MD  in [facility]

## 2018-07-17 NOTE — PATIENT INSTRUCTIONS
IMPRESSION:    1. Cognitive Decline since 2015-- progressively decline- Dementia  2. Hx of shoulder replacement and shoulder surgery  3. One episode of total memory loss-- similar to transient global amnesia( got MRI of brain in [place] 5 years ago)  4. Parkinson Diease vs Parkinsonism  5. Strict Vegan Diet -- in the past 10 years  6. Mild kidney malfunction --- Creatinine 1.7,   7. Using Diaper for one year  8. Weight Loss since April 2018, constipation  9. Abnormal EEG      PLAN/RECOMMENDATIONS:      Explain to the family that Mr. Naidu has dementia and parkinson      This time, we will try Keppra 125mg two times per day , increase the Keppra to 250mg two times per day    We will refer the patient to endocrinologist  Continue Namenda  ________________________________________________________________________    Magnesium L Threonate Capsules (Magtein) 2000mg daily  Vitamin B1( thiamine) 500mg daily  ________________________________________________________________________    ________________________________________________________________________    MAGNESIUM     Neurology. 2017 Oct 17;89(96):0728-3143. doi: 10.1212/[id number]. Epub 2017 Sep 20.  Serum magnesium is associated with the risk of dementia.  Shima BCT1, Bakari S1, Balbir FJ1, Lorelei MK1, Sergio EJ1, Rhonda R1, Sharon BH2, Lorelei MA1.    Mol Brain. 2014 Sep 13;7:65. doi: 10.1186/[id number].  Elevation of brain magnesium prevents synaptic loss and reverses cognitive deficits in Alzheimer's disease mouse model.  Sidra W, Bekah J, Jorden Y, Hiram X, Orin N, Estefania Y, Hiram X, Boubacar W, Terrence C, Jorden MCADAMSG, Becka D, Jorden G1.    ________________________________________________________________________    ROUTINE ELECTROENCEPHALOGRAM REPORT      NAME: Hal Naidu        INTERPRETATION:      ________________________________________________________________________    This is abnormal routine EEG recording in the awake and drowsy/sleep  state(s).    This scalp EEG denotes      1. Mild focal cortical dysfunction more over frontal (L>R) --this patten would increase the risk of seizure - advise clinical correlation regarding management     ________________________________________________________________________

## 2018-08-08 ENCOUNTER — TELEPHONE (OUTPATIENT)
Dept: NEPHROLOGY | Facility: MEDICAL CENTER | Age: 78
End: 2018-08-08

## 2018-08-08 DIAGNOSIS — N18.9 CHRONIC KIDNEY DISEASE, UNSPECIFIED CKD STAGE: ICD-10-CM

## 2018-08-15 ENCOUNTER — OFFICE VISIT (OUTPATIENT)
Dept: ENDOCRINOLOGY | Facility: MEDICAL CENTER | Age: 78
End: 2018-08-15
Payer: MEDICARE

## 2018-08-15 ENCOUNTER — OFFICE VISIT (OUTPATIENT)
Dept: NEPHROLOGY | Facility: MEDICAL CENTER | Age: 78
End: 2018-08-15
Payer: MEDICARE

## 2018-08-15 ENCOUNTER — HOSPITAL ENCOUNTER (OUTPATIENT)
Dept: LAB | Facility: MEDICAL CENTER | Age: 78
End: 2018-08-15
Attending: INTERNAL MEDICINE
Payer: MEDICARE

## 2018-08-15 VITALS
HEIGHT: 66 IN | DIASTOLIC BLOOD PRESSURE: 68 MMHG | TEMPERATURE: 98 F | RESPIRATION RATE: 14 BRPM | OXYGEN SATURATION: 96 % | BODY MASS INDEX: 24.91 KG/M2 | SYSTOLIC BLOOD PRESSURE: 128 MMHG | HEART RATE: 70 BPM | WEIGHT: 155 LBS

## 2018-08-15 VITALS
BODY MASS INDEX: 24.59 KG/M2 | HEART RATE: 80 BPM | WEIGHT: 153 LBS | SYSTOLIC BLOOD PRESSURE: 132 MMHG | HEIGHT: 66 IN | DIASTOLIC BLOOD PRESSURE: 70 MMHG | OXYGEN SATURATION: 98 %

## 2018-08-15 DIAGNOSIS — E22.1 HYPERPROLACTINEMIA (HCC): ICD-10-CM

## 2018-08-15 DIAGNOSIS — N17.9 AKI (ACUTE KIDNEY INJURY) (HCC): ICD-10-CM

## 2018-08-15 DIAGNOSIS — D35.2 PITUITARY MACROADENOMA (HCC): ICD-10-CM

## 2018-08-15 DIAGNOSIS — N13.9 OBSTRUCTIVE UROPATHY: ICD-10-CM

## 2018-08-15 DIAGNOSIS — N18.30 STAGE 3 CHRONIC KIDNEY DISEASE (HCC): ICD-10-CM

## 2018-08-15 DIAGNOSIS — N40.0 BENIGN PROSTATIC HYPERPLASIA, UNSPECIFIED WHETHER LOWER URINARY TRACT SYMPTOMS PRESENT: ICD-10-CM

## 2018-08-15 LAB
FSH SERPL-ACNC: 11.9 MIU/ML (ref 1.5–12.4)
LH SERPL-ACNC: 6 IU/L (ref 1.7–8.6)
PROLACTIN SERPL-MCNC: 28.15 NG/ML (ref 2.1–17.7)
T4 FREE SERPL-MCNC: 0.68 NG/DL (ref 0.53–1.43)
TSH SERPL DL<=0.005 MIU/L-ACNC: 2.02 UIU/ML (ref 0.38–5.33)

## 2018-08-15 PROCEDURE — 99214 OFFICE O/P EST MOD 30 MIN: CPT | Performed by: INTERNAL MEDICINE

## 2018-08-15 PROCEDURE — 84403 ASSAY OF TOTAL TESTOSTERONE: CPT

## 2018-08-15 PROCEDURE — 84270 ASSAY OF SEX HORMONE GLOBUL: CPT

## 2018-08-15 PROCEDURE — 84146 ASSAY OF PROLACTIN: CPT

## 2018-08-15 PROCEDURE — 84443 ASSAY THYROID STIM HORMONE: CPT

## 2018-08-15 PROCEDURE — 36415 COLL VENOUS BLD VENIPUNCTURE: CPT

## 2018-08-15 PROCEDURE — 83002 ASSAY OF GONADOTROPIN (LH): CPT

## 2018-08-15 PROCEDURE — 99204 OFFICE O/P NEW MOD 45 MIN: CPT | Performed by: INTERNAL MEDICINE

## 2018-08-15 PROCEDURE — 84146 ASSAY OF PROLACTIN: CPT | Mod: 91

## 2018-08-15 PROCEDURE — 83001 ASSAY OF GONADOTROPIN (FSH): CPT

## 2018-08-15 PROCEDURE — 84439 ASSAY OF FREE THYROXINE: CPT

## 2018-08-15 ASSESSMENT — ENCOUNTER SYMPTOMS
MEMORY LOSS: 1
NAUSEA: 0
CHILLS: 0
FEVER: 0
VOMITING: 0
HEMOPTYSIS: 0

## 2018-08-15 NOTE — PROGRESS NOTES
"Subjective:      Hal Naidu is a 78 y.o. male who presents with Chronic Kidney Disease            The patient was recently diagnosed with early dementia on top of Parkinson disease.  Also had acute kidney injury which wasn't related to obstructive uropathy, had chronic Stern catheter placed and his creatinine has improved      Chronic Kidney Disease   This is a chronic problem. The current episode started more than 1 year ago. The problem occurs constantly. The problem has been gradually improving. Pertinent negatives include no chest pain, chills, fever, nausea, urinary symptoms or vomiting.       Review of Systems   Constitutional: Positive for malaise/fatigue. Negative for chills and fever.   Respiratory: Negative for hemoptysis.    Cardiovascular: Negative for chest pain and leg swelling.   Gastrointestinal: Negative for nausea and vomiting.   Genitourinary: Negative for dysuria and urgency.   Psychiatric/Behavioral: Positive for memory loss.          Objective:     /68   Pulse 70   Temp 36.7 °C (98 °F)   Resp 14   Ht 1.676 m (5' 6\")   Wt 70.3 kg (155 lb)   SpO2 96%   BMI 25.02 kg/m²      Physical Exam   Constitutional: He is oriented to person, place, and time. He appears well-developed and well-nourished. No distress.   HENT:   Right Ear: External ear normal.   Left Ear: External ear normal.   Nose: Nose normal.   Eyes: Conjunctivae are normal. Right eye exhibits no discharge. Left eye exhibits no discharge. No scleral icterus.   Cardiovascular: Normal rate and regular rhythm.    Pulmonary/Chest: Effort normal and breath sounds normal. No respiratory distress. He has no wheezes.   Genitourinary:   Genitourinary Comments: Stern catheter   Musculoskeletal: He exhibits no edema.   Neurological: He is alert and oriented to person, place, and time.   Skin: Skin is warm. He is not diaphoretic.   Psychiatric: He has a normal mood and affect. His behavior is normal.   Nursing note and vitals " reviewed.              Assessment/Plan:     1. UZIEL (acute kidney injury) (HCC)  Secondary to obstructive uropathy  Creatinine has improved    2. Stage 3 chronic kidney disease  No uremic symptoms  Renal dose all medication  Avoid nephrotoxins    3. Obstructive uropathy  Follow-up with urology    4. Benign prostatic hyperplasia, unspecified whether lower urinary tract symptoms present

## 2018-08-15 NOTE — PROGRESS NOTES
New Patient Consult Note  Primary care physician: Marley Sevilla M.D.    Reason for consult: Pituitary macroadenoma    HPI:  Hal Naidu is a 78 y.o. old patient who comes in today for evaluation of pituitary macroadenoma.  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  The lesion extends into the left cavernous sinus.  No prior history of pituitary/brain MRI.  No change in shoe or ring size.  He complains of generalized weakness.  Recent labs showed a slightly elevated prolactin.  Denies any peripheral vision problems.  No family history of pituitary tumors.    ROS:  Constitutional: Positive for weight loss  Cardiac: No palpitations or racing heart  Resp: No shortness of breath  All other systems were reviewed and were negative.    Past Medical History:  Patient Active Problem List    Diagnosis Date Noted   • Abnormal EEG 07/17/2018   • Pituitary tumor 06/27/2018   • Urinary incontinence 06/19/2018   • Dementia without behavioral disturbance 04/13/2018   • Parkinson disease (HCC) 04/13/2018   • Vegan diet 04/13/2018       Past Surgical History:  History reviewed. No pertinent surgical history.    Allergies:  Patient has no known allergies.    Social History:  Social History     Social History   • Marital status:      Spouse name: N/A   • Number of children: N/A   • Years of education: N/A     Occupational History   • Not on file.     Social History Main Topics   • Smoking status: Never Smoker   • Smokeless tobacco: Never Used   • Alcohol use No   • Drug use: No   • Sexual activity: Not on file     Other Topics Concern   • Not on file     Social History Narrative   • No narrative on file       Family History:  History reviewed. No pertinent family history.    Medications:    Current Outpatient Prescriptions:   •  tamsulosin (FLOMAX) 0.4 MG capsule, , Disp: , Rfl: 5  •  thiamine  "(THIAMINE) 100 MG Tab, Take 100 mg by mouth every day., Disp: , Rfl:   •  Diphenhydramine-APAP, sleep, (TYLENOL PM EXTRA STRENGTH PO), Take  by mouth., Disp: , Rfl:   •  levETIRAcetam (KEPPRA) 250 MG tablet, Take 1 Tab by mouth 2 times a day., Disp: 60 Tab, Rfl: 3  •  cyanocobalamin (VITAMIN B-12) 100 MCG Tab, Take 5,000 mcg by mouth every day., Disp: , Rfl:   •  vitamin D (CHOLECALCIFEROL) 1000 UNIT Tab, Take 1,000 Units by mouth every day., Disp: , Rfl:   •  therapeutic multivitamin-minerals (THERAGRAN-M) Tab, Take 1 Tab by mouth every day., Disp: , Rfl:   •  Omega-3 Fatty Acids (FISH OIL) 1000 MG Cap capsule, Take 1,000 mg by mouth 3 times a day, with meals., Disp: , Rfl:   •  carbidopa-levodopa (SINEMET)  MG Tab, Take 1 Tab by mouth 3 times a day., Disp: 90 Tab, Rfl: 3  •  memantine (NAMENDA) 5 MG Tab, Take 1 Tab by mouth 2 times a day., Disp: 60 Tab, Rfl: 3  •  finasteride (PROSCAR) 5 MG Tab, , Disp: , Rfl: 3  •  levoFLOXacin (LEVAQUIN) 250 MG Tab, , Disp: , Rfl: 0    Labs:  Results for LIZETH VELASQUEZ (MRN [id number]) as of 8/15/2018 08:50   Ref. Range 6/27/2018 11:26   Cortisol Latest Ref Range: 0.0 - 23.0 ug/dL 7.8   TSH Latest Ref Range: 0.380 - 5.330 uIU/mL 2.520   Thyroxine -T4 Latest Ref Range: 4.0 - 12.0 ug/dL 5.2   T3 Latest Ref Range: 60.0 - 181.0 ng/dL 60.2   Acth Latest Ref Range: 7 - 69 pg/mL 19   Follicle Stimulating Hormone Latest Ref Range: 1.5 - 12.4 mIU/mL 12.1   Human Growth Hormone Latest Ref Range: 0.05 - 3.00 ng/mL 0.37   IGF1 Latest Ref Range: 21 - 204 ng/mL 213 (H)   IGF-1 Z Score Calculation Unknown 1.7   Luteinizing Hormone Latest Ref Range: 1.7 - 8.6 IU/L 5.0   Prolactin Latest Ref Range: 2.10 - 17.70 ng/mL 45.18 (H)     Physical Examination:  Vital signs: /70   Pulse 80   Ht 1.676 m (5' 6\")   Wt 69.4 kg (153 lb)   SpO2 98%   BMI 24.69 kg/m²   General: No apparent distress, cooperative  Eyes: No scleral icterus or discharge  ENMT: Normal on external inspection " of nose, lips  Neck: No abnormal masses on inspection  Resp: Normal effort, clear to auscultation bilaterally   CVS: Regular rate and rhythm, S1 S2 normal, no murmur   Extremities: No edema  Neuro: Alert and oriented  Skin: No rash  Psych: Normal mood and affect    Assessment and Plan:    1. Pituitary macroadenoma (HCC)  · Recent labs showed IGF-I level within ±2 standard deviation of normal and prolactin is slightly elevated  · We will check 24 hour urine cortisol  · We will obtain cosyntropin stimulation test  · We will check testosterone level, including LH/FSH  · We will repeat labs for free T4 and TSH  · Advised to schedule an appointment with ophthalmologist for visual field testing as well  - URINE CORTISOL 24 HR, ICMA; Future  - URINE CREATININE 24 HR; Future  - MISCELLANEOUS TEST; Future  - TESTOSTERONE F&T MALE ADULT; Future  - FSH/LH; Future  - TSH; Future  - FREE THYROXINE; Future    2. Hyperprolactinemia (HCC)  · His prolactin is slightly elevated at 45, most likely due to stalk compression rather than prolactinoma  · We will check prolactin by dilution study in addition to repeating prolactin level  - MISCELLANEOUS TEST; Future  - PROLACTIN; Future    Return in about 6 months (around 2/15/2019).    Thank you for allowing me to participate in the care of this patient.    Juan Jackson M.D.  08/15/18    CC:   Marley Sevilla M.D.    This note was created using voice recognition software (Dragon). The accuracy of the dictation is limited by the abilities of the software. I have reviewed the note prior to signing, however some errors in grammar and context are still possible. If you have any questions related to this note please do not hesitate to contact our office.

## 2018-08-16 LAB
SHBG SERPL-SCNC: 38 NMOL/L (ref 11–80)
TESTOST FREE MFR SERPL: 1.7 % (ref 1.6–2.9)
TESTOST FREE SERPL-MCNC: 58 PG/ML (ref 47–244)
TESTOST SERPL-MCNC: 348 NG/DL (ref 300–720)

## 2018-08-17 ENCOUNTER — TELEPHONE (OUTPATIENT)
Dept: NEUROLOGY | Facility: MEDICAL CENTER | Age: 78
End: 2018-08-17

## 2018-08-17 ENCOUNTER — HOSPITAL ENCOUNTER (OUTPATIENT)
Facility: MEDICAL CENTER | Age: 78
End: 2018-08-17
Attending: INTERNAL MEDICINE
Payer: MEDICARE

## 2018-08-17 DIAGNOSIS — D35.2 PITUITARY MACROADENOMA (HCC): ICD-10-CM

## 2018-08-17 LAB
CREAT 24H UR-MSRATE: 1410 MG/24 HR (ref 1000–2000)
CREAT UR-MCNC: 65.6 MG/DL
MISCELLANEOUS LAB RESULT MISCLAB: ABNORMAL
SPECIMEN VOL UR: 2150 ML

## 2018-08-17 PROCEDURE — 82530 CORTISOL FREE: CPT

## 2018-08-17 PROCEDURE — 82570 ASSAY OF URINE CREATININE: CPT

## 2018-08-17 PROCEDURE — 81050 URINALYSIS VOLUME MEASURE: CPT

## 2018-08-17 NOTE — TELEPHONE ENCOUNTER
Bouchra Goyal M.D.; [facility]             Hello,   Patient came in today requesting a referral to [facility].  They have someone they have been in contact with, her name is Tracie her number is [phone] her fax number is [phone].  They are asking for the referral to be sent with the diagnosis, personal information, insurance cards and MRI report.     Thank you!       Spoke to wife asked her to contact pcp they are the ones who manage those kinds of referrals. I reminded her he has appointment on Aug 30.

## 2018-08-21 LAB
ANNOTATION COMMENT IMP: NORMAL
COLLECT DURATION TIME SPEC: 24 HRS
CORTIS F 24H UR HPLC-MCNC: 6.01 UG/L
CORTIS F 24H UR-MRATE: 12.9 UG/D
CORTIS F/CREAT 24H UR: 10.73 UG/G CRT
CREAT 24H UR-MCNC: 56 MG/DL
CREAT 24H UR-MRATE: 1204 MG/D (ref 800–2100)
SPECIMEN VOL ?TM UR: NORMAL ML

## 2018-08-27 ENCOUNTER — TELEPHONE (OUTPATIENT)
Dept: ENDOCRINOLOGY | Facility: MEDICAL CENTER | Age: 78
End: 2018-08-27

## 2018-08-27 DIAGNOSIS — R53.82 CHRONIC FATIGUE: ICD-10-CM

## 2018-08-27 NOTE — TELEPHONE ENCOUNTER
Pt's wife states pt has still been feeling fatigue and tired the fast few days. Is wondering if you can order an intrinsic Vit B12 lab order? Please advise. Also provided pt with infusion clinic number. Has not heard from them yet to schedule appt.

## 2018-08-30 ENCOUNTER — OFFICE VISIT (OUTPATIENT)
Dept: NEUROLOGY | Facility: MEDICAL CENTER | Age: 78
End: 2018-08-30
Payer: MEDICARE

## 2018-08-30 VITALS
WEIGHT: 156.6 LBS | HEART RATE: 60 BPM | HEIGHT: 66 IN | BODY MASS INDEX: 25.17 KG/M2 | OXYGEN SATURATION: 97 % | SYSTOLIC BLOOD PRESSURE: 120 MMHG | TEMPERATURE: 97.7 F | DIASTOLIC BLOOD PRESSURE: 72 MMHG

## 2018-08-30 DIAGNOSIS — G20.A1 PARKINSON DISEASE: ICD-10-CM

## 2018-08-30 DIAGNOSIS — D49.7 PITUITARY TUMOR: ICD-10-CM

## 2018-08-30 DIAGNOSIS — R94.01 ABNORMAL EEG: ICD-10-CM

## 2018-08-30 DIAGNOSIS — F02.80 DEMENTIA ASSOCIATED WITH OTHER UNDERLYING DISEASE WITHOUT BEHAVIORAL DISTURBANCE (HCC): ICD-10-CM

## 2018-08-30 PROCEDURE — 99214 OFFICE O/P EST MOD 30 MIN: CPT | Performed by: PSYCHIATRY & NEUROLOGY

## 2018-08-30 RX ORDER — DONEPEZIL HYDROCHLORIDE 5 MG/1
5 TABLET, FILM COATED ORAL DAILY
Qty: 30 TAB | Refills: 5 | Status: SHIPPED | OUTPATIENT
Start: 2018-08-30 | End: 2018-11-27 | Stop reason: SDUPTHER

## 2018-08-30 RX ORDER — LEVETIRACETAM 250 MG/1
250 TABLET ORAL 2 TIMES DAILY
Qty: 180 TAB | Refills: 1 | Status: SHIPPED | OUTPATIENT
Start: 2018-08-30 | End: 2018-11-27 | Stop reason: SDUPTHER

## 2018-08-30 RX ORDER — MEMANTINE HYDROCHLORIDE 10 MG/1
10 TABLET ORAL 2 TIMES DAILY
Qty: 180 TAB | Refills: 1 | Status: SHIPPED | OUTPATIENT
Start: 2018-08-30 | End: 2018-11-27 | Stop reason: SDUPTHER

## 2018-08-30 NOTE — PROGRESS NOTES
NEUROLOGY NOTE    Referring Physician  Dr. Marley Sevilla MD  in [facility]      CHIEF COMPLAINT:  Progressive memory loss  Chief Complaint   Patient presents with   • Follow-Up     Parkinson's disease       PRESENT ILLNESS:   Since last visit, the patient's mental conditions and physical conditions continue to deteriorate-- constipation, weight loss, urine    Progressive memory loss in the past 3 years, a little shaky recently   The patient was sent to us by his PCP for further care  Cognitive processing speed slowed down  Denied personality change or visual spatial change    Left shoulder surgery status post rotator cuff, right shoulder status post replacement    RED FLAGS for reversible dementia  Headache X  Fluctuation course X  Tremor V   Rapid Progressive onset ( within 2 years) X  MRI hippocampus not atrophy ?        PAST MEDICAL HISTORY:  No past medical history on file.    PAST SURGICAL HISTORY:  No past surgical history on file.    FAMILY HISTORY:  No family history on file.    SOCIAL HISTORY:  Social History     Social History   • Marital status:      Spouse name: N/A   • Number of children: N/A   • Years of education: N/A     Occupational History   • Not on file.     Social History Main Topics   • Smoking status: Never Smoker   • Smokeless tobacco: Never Used   • Alcohol use No   • Drug use: No   • Sexual activity: Not on file     Other Topics Concern   • Not on file     Social History Narrative   • No narrative on file     ALLERGIES:  No Known Allergies  TOBHX  History   Smoking Status   • Never Smoker   Smokeless Tobacco   • Never Used     ALCHX  History   Alcohol Use No     DRUGHX  History   Drug Use No           MEDICATIONS:  Current Outpatient Prescriptions   Medication   • finasteride (PROSCAR) 5 MG Tab   • levoFLOXacin (LEVAQUIN) 250 MG Tab   • tamsulosin (FLOMAX) 0.4 MG capsule   • thiamine (THIAMINE) 100 MG Tab   • levETIRAcetam (KEPPRA) 250 MG tablet   • vitamin D (CHOLECALCIFEROL) 1000 UNIT  "Tab   • therapeutic multivitamin-minerals (THERAGRAN-M) Tab   • Omega-3 Fatty Acids (FISH OIL) 1000 MG Cap capsule   • memantine (NAMENDA) 5 MG Tab   • Diphenhydramine-APAP, sleep, (TYLENOL PM EXTRA STRENGTH PO)   • cyanocobalamin (VITAMIN B-12) 100 MCG Tab   • carbidopa-levodopa (SINEMET)  MG Tab     No current facility-administered medications for this visit.        REVIEW OF SYSTEM:    Constitutional: Denies fevers, Denies weight changes   Eyes: Denies changes in vision, no eye pain   Ears/Nose/Throat/Mouth: hearing impairment  Cardiovascular: Denies chest pain or palpitations   Respiratory: Denies SOB.   Gastrointestinal/Hepatic: Denies abdominal pain, nausea, vomiting, diarrhea, constipation or GI bleeding   Genitourinary: Denies bladder dysfunction, dysuria or frequency   Musculoskeletal/Rheum: Denies joint pain and swelling   Skin/Breast: Denies rash, denies breast lumps or discharge   Neurological: memory problems, confusion  Psychiatric: denies mood disorder   Endocrine: denies hx of diabetes or thyroid dysfunction   Heme/Oncology/Lymph Nodes: Denies enlarged lymph nodes, denies brusing or known bleeding disorder   Allergic/Immunologic: Denies hx of allergies         PHYSICAL AND NEUROLOGICAL EXMAINATIONS:  VITAL SIGNS: /72   Pulse 60   Temp 36.5 °C (97.7 °F)   Ht 1.676 m (5' 6\")   Wt 71 kg (156 lb 9.6 oz)   SpO2 97%   BMI 25.28 kg/m²   CURRENT WEIGHT: BMI: Body mass index is 25.28 kg/m².  PREVIOUS WEIGHTS:  Wt Readings from Last 25 Encounters:   08/30/18 71 kg (156 lb 9.6 oz)   08/15/18 70.3 kg (155 lb)   08/15/18 69.4 kg (153 lb)   07/17/18 69.7 kg (153 lb 12.3 oz)   06/27/18 68.2 kg (150 lb 7.4 oz)   06/19/18 70.8 kg (156 lb)   04/13/18 72.4 kg (159 lb 11.6 oz)       General appearance of patient: WDWN(+) NAD(+)    EYES  o Fundus : Papilledem(-) Exudates(-) Hemorrhage(-)  Nervous System  Orientation to time, place and person(+)  Memory normal(-) Recall 0/3  Language: " aphasia(-)  Knowledge: past(+) Current(+)  Attention(+)  Cranial Nerves  • Nerve 2: intact  • Nerve 3,4,6: intact  • Nerve 5 : intact  • Nerve 7: intact  • Nerve 8: hearing impairment  • Nerve 9 & 10: intact  • Nerve 11: intact  • Nerve 12: intact  Muscle Power and muscle tone: symmetric in upper and lower  Sensory System: Pin sensation intact(+)  Reflexes: symmetric throughout  Cerebellar Function FNP normal   Gait : Steady(-)   Heart and Vascular  Peripheral Vasucular system : Edema (-) Swelling(-)  RHB, Breathing sound clear  abdomen bowel sound normoactive  Extremities freely moveable  Joints no contracture       NEUROIMAGING:        LAB:          Resting tremor - both sides-- : subtle  Rigidity both sides  Bradykinesia both sides R > L  Postural reflex :     Recall 1/3   Mother developed dementia at the age of 80+    Sinemet 25/100 half tab  3 times per day for 2 weeks, then Sinemet 25/100 one tab three times per day made him sick-- will wait till the patient to become stable physically    Seeing   Endocrinologist  Urologist  Going to see Ophthalmologist  Neurologist us    IMPRESSION:    1. Cognitive Decline since 2015-- progressively decline- Dementia  2. Hx of shoulder replacement and shoulder surgery  3. One episode of total memory loss-- similar to transient global amnesia( got MRI of brain in [place] 5 years ago)  4. Parkinson Diease vs Parkinsonism  5. Strict Vegan Diet -- in the past 10 years  6. Mild kidney malfunction    7. Using Diaper for one year, weight Loss since April 2018, constipation-- now in remission  9. Abnormal EEG      PLAN/RECOMMENDATIONS:      Explain to the family that Mr. Naidu has dementia and parkinson      Continue Keppra to 250mg two times per day  Up Namenda to 10mg two times per day\  2 weeks later, add Aricept 5mg daily   If any side effects, stop aricept and notify us    ________________________________________________________________________    Magnesium L Threonate  Capsules (Magtein) 2000mg daily  Vitamin B1( thiamine) 500mg daily  ________________________________________________________________________    ________________________________________________________________________    MAGNESIUM     Neurology. 2017 Oct 17;89(98):3342-5637. doi: 10.1212/[id number]. Epub 2017 Sep 20.  Serum magnesium is associated with the risk of dementia.  Shima BCT1, Bakari S1, Balbir FJ1, Lorelei MK1, Sergio EJ1, Rhonda R1, Sharon BH2, Lorelei MA1.    Mol Brain. 2014 Sep 13;7:65. doi: 10.1186/[id number]-r.  Elevation of brain magnesium prevents synaptic loss and reverses cognitive deficits in Alzheimer's disease mouse model.  Sidra W, Bekah J, Jorden Y, Hiram X, Orin N, Estefania Y, Hiram X, Boubacar W, Terrence C, Jorden XG, Becka D, Leonardo G1.    ________________________________________________________________________    ROUTINE ELECTROENCEPHALOGRAM REPORT      NAME: Hal Naidu        INTERPRETATION:      ________________________________________________________________________    This is abnormal routine EEG recording in the awake and drowsy/sleep state(s).    This scalp EEG denotes      1. Mild focal cortical dysfunction more over frontal (L>R) --this patten would increase the risk of seizure - advise clinical correlation regarding management     ________________________________________________________________________                                      SIGNATURE:  Cora Goyal      CC:  Dr. Marley Sevilla MD  in [facility]

## 2018-08-30 NOTE — PATIENT INSTRUCTIONS
IMPRESSION:    1. Cognitive Decline since 2015-- progressively decline- Dementia  2. Hx of shoulder replacement and shoulder surgery  3. One episode of total memory loss-- similar to transient global amnesia( got MRI of brain in [place] 5 years ago)  4. Parkinson Diease vs Parkinsonism  5. Strict Vegan Diet -- in the past 10 years  6. Mild kidney malfunction    7. Using Diaper for one year, weight Loss since April 2018, constipation-- now in remission  9. Abnormal EEG      PLAN/RECOMMENDATIONS:      Explain to the family that Mr. Naidu has dementia and parkinson    Continue Keppra to 250mg two times per day  Up Namenda to 10mg two times per day\  2 weeks later, add Aricept 5mg daily   If any side effects, stop aricept and notify us    ________________________________________________________________________    Magnesium L Threonate Capsules (Magtein) 2000mg daily  Vitamin B1( thiamine) 500mg daily  ________________________________________________________________________    ________________________________________________________________________    MAGNESIUM     Neurology. 2017 Oct 17;89(73):0523-4651. doi: 10.1212/WNL.[id number]. Epub 2017 Sep 20.  Serum magnesium is associated with the risk of dementia.  Shima BCT1, Bakari S1, Balbir FJ1, Lorelei MK1, Sergio EJ1, Rhonda R1, Sharon BH2, Lorelei MA1.    Mol Brain. 2014 Sep 13;7:65. doi: 10.1186/[id number]-t.  Elevation of brain magnesium prevents synaptic loss and reverses cognitive deficits in Alzheimer's disease mouse model.  Sidra W, Bekah J, Jorden Y, Hiram MCADAMS, Orin N, Estefania Y, Hiram MCADAMS, Boubacar W, Terrence C, Jorden VAZ, Becka ARRINGTON, Jorden G1.    ________________________________________________________________________

## 2018-09-06 DIAGNOSIS — D49.7 PITUITARY TUMOR: ICD-10-CM

## 2018-10-11 ENCOUNTER — OUTPATIENT INFUSION SERVICES (OUTPATIENT)
Dept: ONCOLOGY | Facility: MEDICAL CENTER | Age: 78
End: 2018-10-11
Attending: INTERNAL MEDICINE
Payer: MEDICARE

## 2018-10-11 VITALS
HEART RATE: 72 BPM | DIASTOLIC BLOOD PRESSURE: 65 MMHG | SYSTOLIC BLOOD PRESSURE: 133 MMHG | OXYGEN SATURATION: 97 % | HEIGHT: 67 IN | RESPIRATION RATE: 18 BRPM | BODY MASS INDEX: 24.53 KG/M2 | TEMPERATURE: 98.4 F | WEIGHT: 156.31 LBS

## 2018-10-11 DIAGNOSIS — D35.3 BENIGN NEOPLASM OF PITUITARY GLAND AND CRANIOPHARYNGEAL DUCT (POUCH) (HCC): ICD-10-CM

## 2018-10-11 DIAGNOSIS — D35.2 BENIGN NEOPLASM OF PITUITARY GLAND AND CRANIOPHARYNGEAL DUCT (POUCH) (HCC): ICD-10-CM

## 2018-10-11 DIAGNOSIS — D49.7 PITUITARY TUMOR: ICD-10-CM

## 2018-10-11 DIAGNOSIS — G20.A1 PARKINSON DISEASE: ICD-10-CM

## 2018-10-11 LAB
CORTIS SERPL-MCNC: 19.6 UG/DL (ref 0–23)
CORTIS SERPL-MCNC: 24.6 UG/DL (ref 0–23)
CORTIS SERPL-MCNC: 8.6 UG/DL (ref 0–23)

## 2018-10-11 PROCEDURE — 700111 HCHG RX REV CODE 636 W/ 250 OVERRIDE (IP): Performed by: INTERNAL MEDICINE

## 2018-10-11 PROCEDURE — 82533 TOTAL CORTISOL: CPT

## 2018-10-11 PROCEDURE — 96374 THER/PROPH/DIAG INJ IV PUSH: CPT

## 2018-10-11 PROCEDURE — 36415 COLL VENOUS BLD VENIPUNCTURE: CPT

## 2018-10-11 PROCEDURE — 82024 ASSAY OF ACTH: CPT

## 2018-10-11 RX ORDER — COSYNTROPIN 0.25 MG/ML
0.25 INJECTION, POWDER, FOR SOLUTION INTRAMUSCULAR; INTRAVENOUS ONCE
Status: COMPLETED | OUTPATIENT
Start: 2018-10-11 | End: 2018-10-11

## 2018-10-11 RX ADMIN — COSYNTROPIN 250 MCG: 0.25 INJECTION, POWDER, LYOPHILIZED, FOR SOLUTION INTRAMUSCULAR; INTRAVENOUS at 09:30

## 2018-10-11 ASSESSMENT — PAIN SCALES - GENERAL: PAINLEVEL_OUTOF10: 0

## 2018-10-13 LAB — ACTH PLAS-MCNC: 19 PG/ML (ref 7–69)

## 2018-11-27 ENCOUNTER — OFFICE VISIT (OUTPATIENT)
Dept: NEUROLOGY | Facility: MEDICAL CENTER | Age: 78
End: 2018-11-27
Payer: MEDICARE

## 2018-11-27 VITALS
WEIGHT: 153.8 LBS | DIASTOLIC BLOOD PRESSURE: 74 MMHG | TEMPERATURE: 97.3 F | RESPIRATION RATE: 16 BRPM | HEART RATE: 66 BPM | SYSTOLIC BLOOD PRESSURE: 122 MMHG | HEIGHT: 66 IN | BODY MASS INDEX: 24.72 KG/M2 | OXYGEN SATURATION: 96 %

## 2018-11-27 DIAGNOSIS — G20.A1 PARKINSON DISEASE: ICD-10-CM

## 2018-11-27 DIAGNOSIS — R94.01 ABNORMAL EEG: ICD-10-CM

## 2018-11-27 PROCEDURE — 99214 OFFICE O/P EST MOD 30 MIN: CPT | Performed by: PSYCHIATRY & NEUROLOGY

## 2018-11-27 RX ORDER — DONEPEZIL HYDROCHLORIDE 5 MG/1
5 TABLET, FILM COATED ORAL DAILY
Qty: 90 TAB | Refills: 1 | Status: SHIPPED | OUTPATIENT
Start: 2018-11-27 | End: 2019-04-12 | Stop reason: SDUPTHER

## 2018-11-27 RX ORDER — MEMANTINE HYDROCHLORIDE 10 MG/1
10 TABLET ORAL 2 TIMES DAILY
Qty: 180 TAB | Refills: 1 | Status: SHIPPED | OUTPATIENT
Start: 2018-11-27 | End: 2019-04-12 | Stop reason: SDUPTHER

## 2018-11-27 RX ORDER — CARBIDOPA AND LEVODOPA 25; 100 MG/1; MG/1
1 TABLET, EXTENDED RELEASE ORAL 3 TIMES DAILY
Qty: 90 TAB | Refills: 4 | Status: SHIPPED | OUTPATIENT
Start: 2018-11-27 | End: 2019-04-12 | Stop reason: SDUPTHER

## 2018-11-27 RX ORDER — LEVETIRACETAM 250 MG/1
500 TABLET ORAL 2 TIMES DAILY
Qty: 360 TAB | Refills: 1 | Status: SHIPPED | OUTPATIENT
Start: 2018-11-27 | End: 2019-04-12 | Stop reason: SDUPTHER

## 2018-11-27 NOTE — PATIENT INSTRUCTIONS
IMPRESSION:    1. Cognitive Decline since 2015-- progressively decline- Dementia  2. Hx of shoulder replacement and shoulder surgery  3. One episode of total memory loss-- similar to transient global amnesia( got MRI of brain in [place] 5 years ago)  4. Parkinson Diease vs Parkinsonism  5. Strict Vegan Diet -- in the past 10 years  6. Mild kidney malfunction    7. Using Diaper for one year, weight Loss since April 2018, constipation-- now in remission  9. Abnormal EEG      PLAN/RECOMMENDATIONS:      Explain to the family that Mr. Naidu has dementia and parkinson      Continue   Namenda to 10mg two times per day\  Aricept 5mg daily     Increase Keppra to Keppra to 500mg two times per day for persistent starring spells    This time we will try   Sinemet /25mg tid  If any side effects, stop,         ________________________________________________________________________    Magnesium L Threonate Capsules (Magtein) 2000mg daily  Vitamin B1( thiamine) 500mg daily  ________________________________________________________________________    ________________________________________________________________________

## 2018-11-27 NOTE — PROGRESS NOTES
NEUROLOGY NOTE    Referring Physician  Dr. Marley Sevilla MD  in [facility]      CHIEF COMPLAINT:  Progressive memory loss  remained stable    Chief Complaint   Patient presents with   • Follow-Up     Parkinson's       PRESENT ILLNESS:   Since last visit, the patient's mental conditions and physical conditions remained stable    Progressive memory loss in the past 3 years, a little shaky recently   The patient was sent to us by his PCP for further care  Cognitive processing speed slowed down  Denied personality change or visual spatial change    Left shoulder surgery status post rotator cuff, right shoulder status post replacement    RED FLAGS for reversible dementia  Headache X  Fluctuation course X  Tremor V   Rapid Progressive onset ( within 2 years) X  MRI hippocampus not atrophy ?        PAST MEDICAL HISTORY:  History reviewed. No pertinent past medical history.    PAST SURGICAL HISTORY:  History reviewed. No pertinent surgical history.    FAMILY HISTORY:  History reviewed. No pertinent family history.    SOCIAL HISTORY:  Social History     Social History   • Marital status:      Spouse name: N/A   • Number of children: N/A   • Years of education: N/A     Occupational History   • Not on file.     Social History Main Topics   • Smoking status: Never Smoker   • Smokeless tobacco: Never Used   • Alcohol use No   • Drug use: No   • Sexual activity: Not on file     Other Topics Concern   • Not on file     Social History Narrative   • No narrative on file     ALLERGIES:  No Known Allergies  TOBHX  History   Smoking Status   • Never Smoker   Smokeless Tobacco   • Never Used     ALCHX  History   Alcohol Use No     DRUGHX  History   Drug Use No           MEDICATIONS:  Current Outpatient Prescriptions   Medication   • TURMERIC PO   • Magnesium 400 MG Cap   • levETIRAcetam (KEPPRA) 250 MG tablet   • memantine (NAMENDA) 10 MG Tab   • donepezil (ARICEPT) 5 MG Tab   • thiamine (THIAMINE) 100 MG Tab   • cyanocobalamin  "(VITAMIN B-12) 100 MCG Tab   • vitamin D (CHOLECALCIFEROL) 1000 UNIT Tab   • therapeutic multivitamin-minerals (THERAGRAN-M) Tab   • Omega-3 Fatty Acids (FISH OIL) 1000 MG Cap capsule   • Diphenhydramine-APAP, sleep, (ACETAMINOPHEN PM PO)   • finasteride (PROSCAR) 5 MG Tab   • tamsulosin (FLOMAX) 0.4 MG capsule     No current facility-administered medications for this visit.        REVIEW OF SYSTEM:    Constitutional: Denies fevers, Denies weight changes   Eyes: Denies changes in vision, no eye pain   Ears/Nose/Throat/Mouth: hearing impairment  Cardiovascular: Denies chest pain or palpitations   Respiratory: Denies SOB.   Gastrointestinal/Hepatic: Denies abdominal pain, nausea, vomiting, diarrhea, constipation or GI bleeding   Genitourinary: Denies bladder dysfunction, dysuria or frequency   Musculoskeletal/Rheum: Denies joint pain and swelling   Skin/Breast: Denies rash, denies breast lumps or discharge   Neurological: memory problems, confusion  Psychiatric: denies mood disorder   Endocrine: denies hx of diabetes or thyroid dysfunction   Heme/Oncology/Lymph Nodes: Denies enlarged lymph nodes, denies brusing or known bleeding disorder   Allergic/Immunologic: Denies hx of allergies         PHYSICAL AND NEUROLOGICAL EXMAINATIONS:  VITAL SIGNS: /74 (BP Location: Right arm, Patient Position: Sitting, BP Cuff Size: Adult)   Pulse 66   Temp 36.3 °C (97.3 °F) (Temporal)   Resp 16   Ht 1.689 m (5' 6.5\")   Wt 69.8 kg (153 lb 12.8 oz)   SpO2 96%   BMI 24.46 kg/m²   CURRENT WEIGHT: BMI: Body mass index is 24.46 kg/m².  PREVIOUS WEIGHTS:  Wt Readings from Last 25 Encounters:   11/27/18 69.8 kg (153 lb 12.8 oz)   10/11/18 70.9 kg (156 lb 4.9 oz)   08/30/18 71 kg (156 lb 9.6 oz)   08/15/18 70.3 kg (155 lb)   08/15/18 69.4 kg (153 lb)   07/17/18 69.7 kg (153 lb 12.3 oz)   06/27/18 68.2 kg (150 lb 7.4 oz)   06/19/18 70.8 kg (156 lb)   04/13/18 72.4 kg (159 lb 11.6 oz)       General appearance of patient: WDWN(+) " NAD(+)    EYES  o Fundus : Papilledem(-) Exudates(-) Hemorrhage(-)  Nervous System  Orientation to time, place and person(+)  Memory normal(-) Recall 2/3  Language: aphasia(-)  Knowledge: past(+) Current(+)  Attention(+)  Cranial Nerves  • Nerve 2: intact  • Nerve 3,4,6: intact  • Nerve 5 : intact  • Nerve 7: intact  • Nerve 8: hearing impairment  • Nerve 9 & 10: intact  • Nerve 11: intact  • Nerve 12: intact  Muscle Power and muscle tone: symmetric in upper and lower  Sensory System: Pin sensation intact(+)  Reflexes: symmetric throughout  Cerebellar Function FNP normal   Gait : Steady(-)   Heart and Vascular  Peripheral Vasucular system : Edema (-) Swelling(-)  RHB, Breathing sound clear  abdomen bowel sound normoactive  Extremities freely moveable  Joints no contracture       NEUROIMAGING:        LAB:          Resting tremor - both sides-- : subtle  Rigidity both sides  Bradykinesia both sides R > L  Postural reflex :     Recall 2/3   Mother developed dementia at the age of 80+    Sinemet 25/100 half tab  3 times per day for 2 weeks, then Sinemet 25/100 one tab three times per day made him sick-- will wait till the patient to become stable physically    Seeing   Endocrinologist  Urologist  Going to see Ophthalmologist  Neurologist us    IMPRESSION:    1. Cognitive Decline since 2015-- progressively decline- Dementia  2. Hx of shoulder replacement and shoulder surgery  3. One episode of total memory loss-- similar to transient global amnesia( got MRI of brain in [place] 5 years ago)  4. Parkinson Diease vs Parkinsonism  5. Strict Vegan Diet -- in the past 10 years  6. Mild kidney malfunction    7. Using Diaper for one year, weight Loss since April 2018, constipation-- now in remission  9. Abnormal EEG      PLAN/RECOMMENDATIONS:      Explain to the family that Mr. Naidu has dementia and parkinson      Continue   Namenda to 10mg two times per day\  Aricept 5mg daily     Increase Keppra to Keppra to 500mg two  times per day for persistent starring spells    This time we will try   Sinemet /25mg tid  If any side effects, stop,         ________________________________________________________________________    Magnesium L Threonate Capsules (Magtein) 2000mg daily  Vitamin B1( thiamine) 500mg daily  ________________________________________________________________________    ________________________________________________________________________    MAGNESIUM     Neurology. 2017 Oct 17;89(02):5326-1864. doi: 10.[facility]/[id number]. Epub 2017 Sep 20.  Serum magnesium is associated with the risk of dementia.  Shima BCT1, Bakari S1, Balbir FJ1, Lorelei MK1, Sergio EJ1, Rhonda R1, Sharon BH2, Lorelei MA1.    Mol Brain. 2014 Sep 13;7:65. doi: 10.1186/[id number].  Elevation of brain magnesium prevents synaptic loss and reverses cognitive deficits in Alzheimer's disease mouse model.  Sidra W, Bekah J, Jorden Y, Hiram X, Orin N, Estefania Y, Hiram X, Boubacar W, Terrence C, Jorden MCADAMSG, Becka D, Leonardo G1.    ________________________________________________________________________    ROUTINE ELECTROENCEPHALOGRAM REPORT      NAME: Hal Naidu        INTERPRETATION:      ________________________________________________________________________    This is abnormal routine EEG recording in the awake and drowsy/sleep state(s).    This scalp EEG denotes      1. Mild focal cortical dysfunction more over frontal (L>R) --this patten would increase the risk of seizure - advise clinical correlation regarding management     ________________________________________________________________________                                      SIGNATURE:  Cora Goyal      CC:  Dr. Marley Sevilla MD  in [facility]

## 2018-12-20 ENCOUNTER — TELEPHONE (OUTPATIENT)
Dept: ENDOCRINOLOGY | Facility: MEDICAL CENTER | Age: 78
End: 2018-12-20

## 2018-12-20 DIAGNOSIS — D64.9 ANEMIA, UNSPECIFIED TYPE: Primary | ICD-10-CM

## 2018-12-20 DIAGNOSIS — D49.7 PITUITARY TUMOR: ICD-10-CM

## 2018-12-20 DIAGNOSIS — N13.9 OBSTRUCTIVE UROPATHY: ICD-10-CM

## 2018-12-20 DIAGNOSIS — N28.9 RENAL INSUFFICIENCY: ICD-10-CM

## 2018-12-21 NOTE — TELEPHONE ENCOUNTER
Patients wife called and stated that Dr. Jackson wanted the patient to get an MRI before next visit. There is no imaging ordered for an MRI in the chart. Patients wife is requesting a new order please.

## 2019-02-12 ENCOUNTER — TELEPHONE (OUTPATIENT)
Dept: ENDOCRINOLOGY | Facility: MEDICAL CENTER | Age: 79
End: 2019-02-12

## 2019-02-12 NOTE — TELEPHONE ENCOUNTER
1. Caller Name: Shannan with [facility]                                           Call Back Number: [phone]        Patient approves a detailed voicemail message: N\A    Shannan called stating patient is scheduled for his brain MRI W/O contrast on 3/12/19. This is usually ordered as With and W/O contrast . She wants to confirm you want this W/O contrast.    Please advise

## 2019-03-12 ENCOUNTER — APPOINTMENT (OUTPATIENT)
Dept: RADIOLOGY | Facility: MEDICAL CENTER | Age: 79
End: 2019-03-12
Attending: INTERNAL MEDICINE
Payer: MEDICARE

## 2019-03-18 ENCOUNTER — TELEPHONE (OUTPATIENT)
Dept: NEUROLOGY | Facility: MEDICAL CENTER | Age: 79
End: 2019-03-18

## 2019-03-18 NOTE — TELEPHONE ENCOUNTER
Spoke with pt wife and she let me know that patient has been having sharp pain on left ear (behind ear) started yesterday 3/17/19. Wife stated they went to the ER this morning and they found no evidence of ear infection and gave him pain medication (norco). Wife states while pain occurs pt seems to twitch, please advise if changes need to be made thank you.

## 2019-03-19 NOTE — TELEPHONE ENCOUNTER
Spoke with pt wife and advised on documentation below, I had stated to call me back after the 2 days and give me an update on pt. If this is helping the issue I will advise with Dr. Goyal to write a new script, if it does not help I will try to get pt in sooner.    Try to increase Keppra to 500mg three times per day for 2 days and see   If helpful, I will change the script   I could also see the patient sooner

## 2019-04-02 ENCOUNTER — HOSPITAL ENCOUNTER (OUTPATIENT)
Dept: RADIOLOGY | Facility: MEDICAL CENTER | Age: 79
End: 2019-04-02
Attending: INTERNAL MEDICINE
Payer: MEDICARE

## 2019-04-02 DIAGNOSIS — D49.7 PITUITARY TUMOR: ICD-10-CM

## 2019-04-02 PROCEDURE — 70551 MRI BRAIN STEM W/O DYE: CPT

## 2019-04-12 ENCOUNTER — OFFICE VISIT (OUTPATIENT)
Dept: NEUROLOGY | Facility: MEDICAL CENTER | Age: 79
End: 2019-04-12
Payer: MEDICARE

## 2019-04-12 VITALS
TEMPERATURE: 98.2 F | BODY MASS INDEX: 24.52 KG/M2 | HEIGHT: 67 IN | SYSTOLIC BLOOD PRESSURE: 110 MMHG | OXYGEN SATURATION: 95 % | HEART RATE: 66 BPM | DIASTOLIC BLOOD PRESSURE: 60 MMHG | WEIGHT: 156.2 LBS

## 2019-04-12 DIAGNOSIS — F03.90 DEMENTIA WITHOUT BEHAVIORAL DISTURBANCE, UNSPECIFIED DEMENTIA TYPE: ICD-10-CM

## 2019-04-12 DIAGNOSIS — G20.A1 PARKINSON DISEASE: ICD-10-CM

## 2019-04-12 DIAGNOSIS — R47.1 DYSARTHRIA: ICD-10-CM

## 2019-04-12 PROCEDURE — 99214 OFFICE O/P EST MOD 30 MIN: CPT | Performed by: PSYCHIATRY & NEUROLOGY

## 2019-04-12 RX ORDER — MEMANTINE HYDROCHLORIDE 10 MG/1
10 TABLET ORAL 2 TIMES DAILY
Qty: 180 TAB | Refills: 2 | Status: SHIPPED | OUTPATIENT
Start: 2019-04-12

## 2019-04-12 RX ORDER — OXYCODONE HYDROCHLORIDE AND ACETAMINOPHEN 5; 325 MG/1; MG/1
1-2 TABLET ORAL EVERY 4 HOURS PRN
COMMUNITY

## 2019-04-12 RX ORDER — LEVETIRACETAM 500 MG/1
500 TABLET ORAL 3 TIMES DAILY
Qty: 270 TAB | Refills: 1 | Status: SHIPPED | OUTPATIENT
Start: 2019-04-12

## 2019-04-12 RX ORDER — CARBIDOPA AND LEVODOPA 25; 100 MG/1; MG/1
2 TABLET, EXTENDED RELEASE ORAL 3 TIMES DAILY
Qty: 540 TAB | Refills: 1 | Status: SHIPPED | OUTPATIENT
Start: 2019-04-12

## 2019-04-12 RX ORDER — DONEPEZIL HYDROCHLORIDE 5 MG/1
5 TABLET, FILM COATED ORAL DAILY
Qty: 90 TAB | Refills: 2 | Status: SHIPPED | OUTPATIENT
Start: 2019-04-12

## 2019-04-12 NOTE — PROGRESS NOTES
NEUROLOGY NOTE    Referring Physician  Dr. Marley Sevilla MD  in [facility]      CHIEF COMPLAINT:  Progressive memory loss  remained stable    Chief Complaint   Patient presents with   • Follow-Up     parkinsons Disease       PRESENT ILLNESS:   Since last visit, the patient's mental conditions and physical conditions remained stable    Progressive memory loss in the past 3 years, a little shaky recently   The patient was sent to us by his PCP for further care  Cognitive processing speed slowed down  Denied personality change or visual spatial change    Left shoulder surgery status post rotator cuff, right shoulder status post replacement    RED FLAGS for reversible dementia  Headache X  Fluctuation course X  Tremor V   Rapid Progressive onset ( within 2 years) X  MRI hippocampus not atrophy ?        PAST MEDICAL HISTORY:  No past medical history on file.    PAST SURGICAL HISTORY:  No past surgical history on file.    FAMILY HISTORY:  No family history on file.    SOCIAL HISTORY:  Social History     Social History   • Marital status:      Spouse name: N/A   • Number of children: N/A   • Years of education: N/A     Occupational History   • Not on file.     Social History Main Topics   • Smoking status: Never Smoker   • Smokeless tobacco: Never Used   • Alcohol use No   • Drug use: No   • Sexual activity: Not on file     Other Topics Concern   • Not on file     Social History Narrative   • No narrative on file     ALLERGIES:  No Known Allergies  TOBHX  History   Smoking Status   • Never Smoker   Smokeless Tobacco   • Never Used     ALCHX  History   Alcohol Use No     DRUGHX  History   Drug Use No           MEDICATIONS:  Current Outpatient Prescriptions   Medication   • oxyCODONE-acetaminophen (PERCOCET) 5-325 MG Tab   • levETIRAcetam (KEPPRA) 250 MG tablet   • donepezil (ARICEPT) 5 MG Tab   • memantine (NAMENDA) 10 MG Tab   • carbidopa-levodopa CR (SINEMET CR)  MG per tablet   • TURMERIC PO   • Magnesium 400  "MG Cap   • Diphenhydramine-APAP, sleep, (ACETAMINOPHEN PM PO)   • finasteride (PROSCAR) 5 MG Tab   • thiamine (THIAMINE) 100 MG Tab   • cyanocobalamin (VITAMIN B-12) 100 MCG Tab   • vitamin D (CHOLECALCIFEROL) 1000 UNIT Tab   • therapeutic multivitamin-minerals (THERAGRAN-M) Tab   • Omega-3 Fatty Acids (FISH OIL) 1000 MG Cap capsule   • tamsulosin (FLOMAX) 0.4 MG capsule     No current facility-administered medications for this visit.        REVIEW OF SYSTEM:    Constitutional: Denies fevers, Denies weight changes   Eyes: Denies changes in vision, no eye pain   Ears/Nose/Throat/Mouth: hearing impairment  Cardiovascular: Denies chest pain or palpitations   Respiratory: Denies SOB.   Gastrointestinal/Hepatic: Denies abdominal pain, nausea, vomiting, diarrhea, constipation or GI bleeding   Genitourinary: Denies bladder dysfunction, dysuria or frequency   Musculoskeletal/Rheum: Denies joint pain and swelling   Skin/Breast: Denies rash, denies breast lumps or discharge   Neurological: memory problems, confusion  Psychiatric: denies mood disorder   Endocrine: denies hx of diabetes or thyroid dysfunction   Heme/Oncology/Lymph Nodes: Denies enlarged lymph nodes, denies brusing or known bleeding disorder   Allergic/Immunologic: Denies hx of allergies         PHYSICAL AND NEUROLOGICAL EXMAINATIONS:  VITAL SIGNS: /60 (BP Location: Left arm, Patient Position: Sitting, BP Cuff Size: Adult)   Pulse 66   Temp 36.8 °C (98.2 °F)   Ht 1.702 m (5' 7\")   Wt 70.9 kg (156 lb 3.2 oz)   SpO2 95%   BMI 24.46 kg/m²   CURRENT WEIGHT: BMI: Body mass index is 24.46 kg/m².  PREVIOUS WEIGHTS:  Wt Readings from Last 25 Encounters:   04/12/19 70.9 kg (156 lb 3.2 oz)   11/27/18 69.8 kg (153 lb 12.8 oz)   10/11/18 70.9 kg (156 lb 4.9 oz)   08/30/18 71 kg (156 lb 9.6 oz)   08/15/18 70.3 kg (155 lb)   08/15/18 69.4 kg (153 lb)   07/17/18 69.7 kg (153 lb 12.3 oz)   06/27/18 68.2 kg (150 lb 7.4 oz)   06/19/18 70.8 kg (156 lb)   04/13/18 72.4 " kg (159 lb 11.6 oz)       General appearance of patient: WDWN(+) NAD(+)    EYES  o Fundus : Papilledem(-) Exudates(-) Hemorrhage(-)  Nervous System  Orientation to time, place and person(+)  Memory normal(-) Recall 2/3  Language: aphasia(-)  Knowledge: past(+) Current(+)  Attention(+)  Cranial Nerves  • Nerve 2: intact  • Nerve 3,4,6: intact  • Nerve 5 : intact  • Nerve 7: intact  • Nerve 8: hearing impairment  • Nerve 9 & 10: intact  • Nerve 11: intact  • Nerve 12: intact  Muscle Power and muscle tone: symmetric in upper and lower  Sensory System: Pin sensation intact(+)  Reflexes: symmetric throughout  Cerebellar Function FNP normal   Gait : Steady(-)   Heart and Vascular  Peripheral Vasucular system : Edema (-) Swelling(-)  RHB, Breathing sound clear  abdomen bowel sound normoactive  Extremities freely moveable  Joints no contracture       NEUROIMAGING:        LAB:          Resting tremor - both sides-- : subtle  Rigidity both sides  Bradykinesia both sides R > L  Postural reflex :     Recall 2/3   Mother developed dementia at the age of 80+    Sinemet 25/100 half tab  3 times per day for 2 weeks, then Sinemet 25/100 one tab three times per day made him sick-- will wait till the patient to become stable physically    Seeing   Endocrinologist  Urologist  Going to see Ophthalmologist  Neurologist us    IMPRESSION:    1. Cognitive Decline since 2015-- progressively decline- Dementia  2. Hx of shoulder replacement and shoulder surgery  3. One episode of total memory loss-- similar to transient global amnesia( got MRI of brain in [place] 5 years ago)  4. Parkinson Diease vs Parkinsonism  5. Strict Vegan Diet -- in the past 10 years  6. Mild kidney malfunction    7. Using Diaper for one year, weight Loss since April 2018, constipation-- now in remission  9. Abnormal EEG  10. Left ear shooting pain -- since last visit, relieved somewhat by Keppra  11. Newly onset of nasal speech-- could that be myasthenia gravis? Will  check MG antibiotic  12. Self catheterization of bladder since 2018-- cause?      PLAN/RECOMMENDATIONS:      Explain to the family that Mr. Naidu mostly has dementia and parkinson  Advise not driviing    Continue   Namenda to 10mg two times per day  Aricept 5mg daily     Continue Keppra to 500mg three times per day for persistent starring spells and left ear shooting pain    This time we will increase  Sinemet /25mg 2# tid-- to help the balance  If any side effects, please go back to 1# tid       2018 2019    ________________________________________________________________________    Magnesium L Threonate Capsules (Magtein) 2000mg daily  Vitamin B1( thiamine) 500mg daily  ________________________________________________________________________    ________________________________________________________________________    MAGNESIUM     Neurology. 2017 Oct 17;89(12):7377-6278. doi: 10.1212/WNL.[id number]. Epub 2017 Sep 20.  Serum magnesium is associated with the risk of dementia.  Shima BCT1, Bakari S1, Balbir FJ1, Lorelei MK1, Sergio EJ1, Rhonda R1, Sharon BH2, Lorelei MASTERSON1.    Mol Brain. 2014 Sep 13;7:65. doi: 10.1186/[id number].  Elevation of brain magnesium prevents synaptic loss and reverses cognitive deficits in Alzheimer's disease mouse model.  Sidra W, Bekah J, Jorden Y, Hirma X, Orin N, Estefania Y, Gandhi X, Boubacar W, Terrence C, Leonardo XG, Becka D, Leonardo G1.    ________________________________________________________________________    ROUTINE ELECTROENCEPHALOGRAM REPORT      NAME: Hal Naidu        INTERPRETATION:      ________________________________________________________________________    This is abnormal routine EEG recording in the awake and drowsy/sleep state(s).    This scalp EEG denotes      1. Mild focal cortical dysfunction more over frontal (L>R) --this patten would increase the risk of seizure - advise clinical correlation regarding management      ________________________________________________________________________                                      SIGNATURE:  Cora Goyal      CC:  Dr. Marley Sevilla MD  in [facility]

## 2019-04-12 NOTE — PATIENT INSTRUCTIONS
IMPRESSION:    1. Cognitive Decline since 2015-- progressively decline- Dementia  2. Hx of shoulder replacement and shoulder surgery  3. One episode of total memory loss-- similar to transient global amnesia( got MRI of brain in [place] 5 years ago)  4. Parkinson Diease vs Parkinsonism  5. Strict Vegan Diet -- in the past 10 years  6. Mild kidney malfunction    7. Using Diaper for one year, weight Loss since April 2018, constipation-- now in remission  9. Abnormal EEG  10. Left ear shooting pain -- since last visit, relieved somewhat by Keppra  11. Newly onset of nasal speech-- could that be myasthenia gravis? Will check MG antibiotic      PLAN/RECOMMENDATIONS:      Explain to the family that Mr. Naidu mostly has dementia and parkinson  Advise not driviing    Continue   Namenda to 10mg two times per day  Aricept 5mg daily     Continue Keppra to 500mg three times per day for persistent starring spells and left ear shooting pain    This time we will increase  Sinemet /25mg 2# tid-- to help the balance  If any side effects, please go back to 1# tid       2018 2019    ________________________________________________________________________    Magnesium L Threonate Capsules (Magtein) 2000mg daily  Vitamin B1( thiamine) 500mg daily

## 2019-05-21 ENCOUNTER — OFFICE VISIT (OUTPATIENT)
Dept: NEUROLOGY | Facility: MEDICAL CENTER | Age: 79
End: 2019-05-21
Payer: MEDICARE

## 2019-05-21 ENCOUNTER — HOSPITAL ENCOUNTER (OUTPATIENT)
Dept: LAB | Facility: MEDICAL CENTER | Age: 79
End: 2019-05-21
Attending: PSYCHIATRY & NEUROLOGY
Payer: MEDICARE

## 2019-05-21 VITALS
WEIGHT: 157 LBS | OXYGEN SATURATION: 97 % | BODY MASS INDEX: 24.64 KG/M2 | HEART RATE: 59 BPM | HEIGHT: 67 IN | TEMPERATURE: 97.9 F | DIASTOLIC BLOOD PRESSURE: 72 MMHG | SYSTOLIC BLOOD PRESSURE: 134 MMHG

## 2019-05-21 DIAGNOSIS — E53.8 B12 DEFICIENCY: ICD-10-CM

## 2019-05-21 DIAGNOSIS — F03.90 DEMENTIA WITHOUT BEHAVIORAL DISTURBANCE, UNSPECIFIED DEMENTIA TYPE: ICD-10-CM

## 2019-05-21 DIAGNOSIS — G20.A1 PARKINSON DISEASE: ICD-10-CM

## 2019-05-21 LAB — VIT B12 SERPL-MCNC: 714 PG/ML (ref 211–911)

## 2019-05-21 PROCEDURE — 99214 OFFICE O/P EST MOD 30 MIN: CPT | Performed by: PSYCHIATRY & NEUROLOGY

## 2019-05-21 PROCEDURE — 82607 VITAMIN B-12: CPT

## 2019-05-21 PROCEDURE — 36415 COLL VENOUS BLD VENIPUNCTURE: CPT

## 2019-05-21 ASSESSMENT — PATIENT HEALTH QUESTIONNAIRE - PHQ9
SUM OF ALL RESPONSES TO PHQ QUESTIONS 1-9: 18
CLINICAL INTERPRETATION OF PHQ2 SCORE: 3
5. POOR APPETITE OR OVEREATING: 3 - NEARLY EVERY DAY

## 2019-05-21 NOTE — PROGRESS NOTES
NEUROLOGY NOTE    Referring Physician  Dr. Marley Sevilla MD  in [facility]      CHIEF COMPLAINT:  Progressive memory loss  remained stable    Chief Complaint   Patient presents with   • Follow-Up     Parkinson disease        PRESENT ILLNESS:   Since last visit, the patient's mental conditions and physical conditions remained stable    Progressive memory loss in the past 3 years, a little shaky recently   The patient was sent to us by his PCP for further care  Cognitive processing speed slowed down  Denied personality change or visual spatial change    Left shoulder surgery status post rotator cuff, right shoulder status post replacement    RED FLAGS for reversible dementia  Headache X  Fluctuation course X  Tremor V   Rapid Progressive onset ( within 2 years) X  MRI hippocampus not atrophy ?        PAST MEDICAL HISTORY:  History reviewed. No pertinent past medical history.    PAST SURGICAL HISTORY:  History reviewed. No pertinent surgical history.    FAMILY HISTORY:  History reviewed. No pertinent family history.    SOCIAL HISTORY:  Social History     Social History   • Marital status:      Spouse name: N/A   • Number of children: N/A   • Years of education: N/A     Occupational History   • Not on file.     Social History Main Topics   • Smoking status: Never Smoker   • Smokeless tobacco: Never Used   • Alcohol use No   • Drug use: No   • Sexual activity: Not on file     Other Topics Concern   • Not on file     Social History Narrative   • No narrative on file     ALLERGIES:  No Known Allergies  TOBHX  History   Smoking Status   • Never Smoker   Smokeless Tobacco   • Never Used     ALCHX  History   Alcohol Use No     DRUGHX  History   Drug Use No           MEDICATIONS:  Current Outpatient Prescriptions   Medication   • oxyCODONE-acetaminophen (PERCOCET) 5-325 MG Tab   • carbidopa-levodopa CR (SINEMET CR)  MG per tablet   • donepezil (ARICEPT) 5 MG Tab   • levETIRAcetam (KEPPRA) 500 MG Tab   • memantine  "(NAMENDA) 10 MG Tab   • TURMERIC PO   • Magnesium 400 MG Cap   • Diphenhydramine-APAP, sleep, (ACETAMINOPHEN PM PO)   • finasteride (PROSCAR) 5 MG Tab   • tamsulosin (FLOMAX) 0.4 MG capsule   • thiamine (THIAMINE) 100 MG Tab   • cyanocobalamin (VITAMIN B-12) 100 MCG Tab   • vitamin D (CHOLECALCIFEROL) 1000 UNIT Tab   • therapeutic multivitamin-minerals (THERAGRAN-M) Tab   • Omega-3 Fatty Acids (FISH OIL) 1000 MG Cap capsule     No current facility-administered medications for this visit.        REVIEW OF SYSTEM:    Constitutional: Denies fevers, Denies weight changes   Eyes: Denies changes in vision, no eye pain   Ears/Nose/Throat/Mouth: hearing impairment  Cardiovascular: Denies chest pain or palpitations   Respiratory: Denies SOB.   Gastrointestinal/Hepatic: Denies abdominal pain, nausea, vomiting, diarrhea, constipation or GI bleeding   Genitourinary: Denies bladder dysfunction, dysuria or frequency   Musculoskeletal/Rheum: Denies joint pain and swelling   Skin/Breast: Denies rash, denies breast lumps or discharge   Neurological: memory problems, confusion  Psychiatric: denies mood disorder   Endocrine: denies hx of diabetes or thyroid dysfunction   Heme/Oncology/Lymph Nodes: Denies enlarged lymph nodes, denies brusing or known bleeding disorder   Allergic/Immunologic: Denies hx of allergies         PHYSICAL AND NEUROLOGICAL EXMAINATIONS:  VITAL SIGNS: /72   Pulse (!) 59   Temp 36.6 °C (97.9 °F) (Temporal)   Ht 1.702 m (5' 7\")   Wt 71.2 kg (157 lb)   SpO2 97%   BMI 24.59 kg/m²   CURRENT WEIGHT: BMI: Body mass index is 24.59 kg/m².  PREVIOUS WEIGHTS:  Wt Readings from Last 25 Encounters:   05/21/19 71.2 kg (157 lb)   04/12/19 70.9 kg (156 lb 3.2 oz)   11/27/18 69.8 kg (153 lb 12.8 oz)   10/11/18 70.9 kg (156 lb 4.9 oz)   08/30/18 71 kg (156 lb 9.6 oz)   08/15/18 70.3 kg (155 lb)   08/15/18 69.4 kg (153 lb)   07/17/18 69.7 kg (153 lb 12.3 oz)   06/27/18 68.2 kg (150 lb 7.4 oz)   06/19/18 70.8 kg (156 " lb)   04/13/18 72.4 kg (159 lb 11.6 oz)       General appearance of patient: WDWN(+) NAD(+)    EYES  o Fundus : Papilledem(-) Exudates(-) Hemorrhage(-)  Nervous System  Orientation to time, place and person(+)  Memory normal(-) Recall 2/3  Language: aphasia(-)  Knowledge: past(+) Current(+)  Attention(+)  Cranial Nerves  • Nerve 2: intact  • Nerve 3,4,6: intact  • Nerve 5 : intact  • Nerve 7: intact  • Nerve 8: hearing impairment  • Nerve 9 & 10: intact  • Nerve 11: intact  • Nerve 12: intact  Muscle Power and muscle tone: symmetric in upper and lower  Sensory System: Pin sensation intact(+)  Reflexes: symmetric throughout  Cerebellar Function FNP normal   Gait : Steady(-)   Heart and Vascular  Peripheral Vasucular system : Edema (-) Swelling(-)  RHB, Breathing sound clear  abdomen bowel sound normoactive  Extremities freely moveable  Joints no contracture       NEUROIMAGING:        LAB:          Resting tremor - both sides-- : subtle  Rigidity both sides  Bradykinesia both sides R > L  Postural reflex :     Recall 2/3   Mother developed dementia at the age of 80+    Sinemet 25/100 half tab  3 times per day for 2 weeks, then Sinemet 25/100 one tab three times per day made him sick-- will wait till the patient to become stable physically    Seeing   Endocrinologist  Urologist  Going to see Ophthalmologist  Neurologist us    IMPRESSION:    1. Cognitive Decline since 2015-- progressively decline- Dementia  2. Hx of shoulder replacement and shoulder surgery  3. One episode of total memory loss-- similar to transient global amnesia( got MRI of brain in [place] 5 years ago)  4. Parkinson Diease vs Parkinsonism  5. Strict Vegan Diet -- in the past 10 years  6. Mild kidney malfunction    7. Using Diaper for one year, weight Loss since April 2018, constipation-- now in remission  9. Abnormal EEG  10. Left ear shooting pain -- since last visit, relieved somewhat by Keppra  11. Newly onset of nasal speech-- could that be  myasthenia gravis? Will check MG antibiotic  12. Self catheterization of bladder since 2018-- cause?      PLAN/RECOMMENDATIONS:      Explain to the family that Mr. Naidu mostly has dementia and parkinson  Advise not driviing in the city    Continue   Namenda to 10mg two times per day  Aricept 5mg daily     Continue Keppra to 500mg three times per day for persistent starring spells and left ear shooting pain  Sinemet /25mg 2# tid-- to help the balance  If any side effects, please go back to 1# tid       2018 2019    ________________________________________________________________________    Magnesium L Threonate Capsules (Magtein) 2000mg daily  Vitamin B1( thiamine) 500mg daily  ________________________________________________________________________    ________________________________________________________________________    MAGNESIUM     Neurology. 2017 Oct 17;89(53):4768-9420. doi: 10.1212/WNL.[id number]. Epub 2017 Sep 20.  Serum magnesium is associated with the risk of dementia.  Shima BCT1, Bakari S1, Balbir FJ1, Lorelei MK1, Sergio EJ1, Rhonda R1, Sharon BH2, Lorelei MA1.    Mol Brain. 2014 Sep 13;7:65. doi: 10.1186/[id number].  Elevation of brain magnesium prevents synaptic loss and reverses cognitive deficits in Alzheimer's disease mouse model.  Sidra W, Bekah J, Jorden Y, Hiram X, Orin N, Estefania Y, Hiram X, Boubacar W, Terrence C, Jorden XG, Becka D, Jorden G1.    ________________________________________________________________________    ROUTINE ELECTROENCEPHALOGRAM REPORT      NAME: Hal Naidu        INTERPRETATION:      ________________________________________________________________________    This is abnormal routine EEG recording in the awake and drowsy/sleep state(s).    This scalp EEG denotes      1. Mild focal cortical dysfunction more over frontal (L>R) --this patten would increase the risk of seizure - advise clinical correlation regarding management          ________________________________________________________________________                                      SIGNATURE:  Cora Goyal      CC:  Dr. Marley Sevilla MD  in [facility]

## 2019-07-24 ENCOUNTER — APPOINTMENT (OUTPATIENT)
Dept: NEPHROLOGY | Facility: MEDICAL CENTER | Age: 79
End: 2019-07-24
Payer: MEDICARE

## 2024-07-23 NOTE — PROGRESS NOTES
ROUTINE ELECTROENCEPHALOGRAM REPORT      NAME: Hal Naidu    REFERRING Dr: ANGIE    DURATION: 24 minutes    INDICATION: Cognitive decline since 2015 - progrssivley. One epiesode of total memory loss (TGA).      TECHNIQUE: 30 channel routine electroencephalogram (EEG) was performed in accordance with the international 10-20 system. The study was reviewed in bipolar and referential montages. The recording examined the patient during wakeful and drowsy state(s).     DESCRIPTION OF THE RECORD:      Background rhythm during awake stage shows well-organized, well-developed, average voltage 9 to 11 hertz alpha activity in the posterior regions.  It blocks with eye opening and it is bilaterally synchronous and symmetrical.  No spike-and-wave discharges but poly and monomorphic delta abnormalities at times generalized and at times more over left are seen.  Photic stimulation did not produce any abnormalities. Stage I sleep was achieved.      ACTIVATION PROCEDURES:      Photic Stimulation were done    ICTAL AND/OR INTERICTAL FINDINGS:    No spike-and-wave discharges but poly and monomorphic delta abnormalities at times generalized and at times more over left are seen No clinical events or seizures were reported or recorded during the study.      EKG: sampling of the EKG recording demonstrated sinus rhythm.        INTERPRETATION:      ________________________________________________________________________    This is abnormal routine EEG recording in the awake and drowsy/sleep state(s).    This scalp EEG denotes      1. Mild focal cortical dysfunction more over frontal (L>R) --this patten would increase the risk of seizure - advise clinical correlation regarding management     ________________________________________________________________________                            
No

## 2025-01-25 NOTE — PROGRESS NOTES
Here for ACTH Stimulation test. In good spirits, voices no new complaints. Teaching and education provided, also emotional support. Base line drawn. Cortrosyn given IVP at 09:30. Plasma cortisol level drawn at 10:00 and 10:30, well tolerated. PIV removed, compression dressing applied. Discharge home with wife to self care. No further appointments needed.     no abrasions, no jaundice, no lesions, no pruritis, and no rashes.